# Patient Record
Sex: MALE | Race: WHITE | NOT HISPANIC OR LATINO | Employment: OTHER | URBAN - METROPOLITAN AREA
[De-identification: names, ages, dates, MRNs, and addresses within clinical notes are randomized per-mention and may not be internally consistent; named-entity substitution may affect disease eponyms.]

---

## 2022-01-03 ENCOUNTER — APPOINTMENT (OUTPATIENT)
Dept: RADIOLOGY | Facility: MEDICAL CENTER | Age: 67
DRG: 286 | End: 2022-01-03
Attending: EMERGENCY MEDICINE
Payer: MEDICARE

## 2022-01-03 ENCOUNTER — HOSPITAL ENCOUNTER (INPATIENT)
Facility: MEDICAL CENTER | Age: 67
LOS: 5 days | DRG: 286 | End: 2022-01-08
Attending: EMERGENCY MEDICINE | Admitting: STUDENT IN AN ORGANIZED HEALTH CARE EDUCATION/TRAINING PROGRAM
Payer: MEDICARE

## 2022-01-03 DIAGNOSIS — J96.01 ACUTE RESPIRATORY FAILURE WITH HYPOXIA (HCC): ICD-10-CM

## 2022-01-03 DIAGNOSIS — I50.21 ACUTE SYSTOLIC HEART FAILURE (HCC): ICD-10-CM

## 2022-01-03 DIAGNOSIS — R07.9 CHEST PAIN, UNSPECIFIED TYPE: ICD-10-CM

## 2022-01-03 DIAGNOSIS — I48.91 ATRIAL FIBRILLATION WITH RAPID VENTRICULAR RESPONSE (HCC): ICD-10-CM

## 2022-01-03 DIAGNOSIS — N17.9 AKI (ACUTE KIDNEY INJURY) (HCC): ICD-10-CM

## 2022-01-03 DIAGNOSIS — E87.70 HYPERVOLEMIA, UNSPECIFIED HYPERVOLEMIA TYPE: ICD-10-CM

## 2022-01-03 DIAGNOSIS — I48.91 ATRIAL FIBRILLATION, UNSPECIFIED TYPE (HCC): ICD-10-CM

## 2022-01-03 PROBLEM — E78.5 DYSLIPIDEMIA: Status: ACTIVE | Noted: 2022-01-03

## 2022-01-03 LAB
ALBUMIN SERPL BCP-MCNC: 3.7 G/DL (ref 3.2–4.9)
ALBUMIN/GLOB SERPL: 1.6 G/DL
ALP SERPL-CCNC: 117 U/L (ref 30–99)
ALT SERPL-CCNC: 49 U/L (ref 2–50)
ANION GAP SERPL CALC-SCNC: 13 MMOL/L (ref 7–16)
AST SERPL-CCNC: 41 U/L (ref 12–45)
BASOPHILS # BLD AUTO: 0.8 % (ref 0–1.8)
BASOPHILS # BLD: 0.08 K/UL (ref 0–0.12)
BILIRUB SERPL-MCNC: 1.2 MG/DL (ref 0.1–1.5)
BUN SERPL-MCNC: 35 MG/DL (ref 8–22)
CALCIUM SERPL-MCNC: 9.3 MG/DL (ref 8.5–10.5)
CHLORIDE SERPL-SCNC: 102 MMOL/L (ref 96–112)
CO2 SERPL-SCNC: 26 MMOL/L (ref 20–33)
CREAT SERPL-MCNC: 1.62 MG/DL (ref 0.5–1.4)
EKG IMPRESSION: NORMAL
EOSINOPHIL # BLD AUTO: 0.13 K/UL (ref 0–0.51)
EOSINOPHIL NFR BLD: 1.2 % (ref 0–6.9)
ERYTHROCYTE [DISTWIDTH] IN BLOOD BY AUTOMATED COUNT: 48.8 FL (ref 35.9–50)
GLOBULIN SER CALC-MCNC: 2.3 G/DL (ref 1.9–3.5)
GLUCOSE SERPL-MCNC: 85 MG/DL (ref 65–99)
HCT VFR BLD AUTO: 46.3 % (ref 42–52)
HGB BLD-MCNC: 15.2 G/DL (ref 14–18)
IMM GRANULOCYTES # BLD AUTO: 0.05 K/UL (ref 0–0.11)
IMM GRANULOCYTES NFR BLD AUTO: 0.5 % (ref 0–0.9)
LACTATE BLD-SCNC: 1.5 MMOL/L (ref 0.5–2)
LYMPHOCYTES # BLD AUTO: 1.72 K/UL (ref 1–4.8)
LYMPHOCYTES NFR BLD: 16.3 % (ref 22–41)
MCH RBC QN AUTO: 30.1 PG (ref 27–33)
MCHC RBC AUTO-ENTMCNC: 32.8 G/DL (ref 33.7–35.3)
MCV RBC AUTO: 91.7 FL (ref 81.4–97.8)
MONOCYTES # BLD AUTO: 1.09 K/UL (ref 0–0.85)
MONOCYTES NFR BLD AUTO: 10.4 % (ref 0–13.4)
NEUTROPHILS # BLD AUTO: 7.46 K/UL (ref 1.82–7.42)
NEUTROPHILS NFR BLD: 70.8 % (ref 44–72)
NRBC # BLD AUTO: 0 K/UL
NRBC BLD-RTO: 0 /100 WBC
NT-PROBNP SERPL IA-MCNC: 7400 PG/ML (ref 0–125)
PLATELET # BLD AUTO: 298 K/UL (ref 164–446)
PMV BLD AUTO: 11.3 FL (ref 9–12.9)
POTASSIUM SERPL-SCNC: 4.3 MMOL/L (ref 3.6–5.5)
PROT SERPL-MCNC: 6 G/DL (ref 6–8.2)
RBC # BLD AUTO: 5.05 M/UL (ref 4.7–6.1)
SODIUM SERPL-SCNC: 141 MMOL/L (ref 135–145)
TROPONIN T SERPL-MCNC: 51 NG/L (ref 6–19)
WBC # BLD AUTO: 10.5 K/UL (ref 4.8–10.8)

## 2022-01-03 PROCEDURE — 770020 HCHG ROOM/CARE - TELE (206)

## 2022-01-03 PROCEDURE — A9270 NON-COVERED ITEM OR SERVICE: HCPCS | Performed by: STUDENT IN AN ORGANIZED HEALTH CARE EDUCATION/TRAINING PROGRAM

## 2022-01-03 PROCEDURE — 96372 THER/PROPH/DIAG INJ SC/IM: CPT

## 2022-01-03 PROCEDURE — 84145 PROCALCITONIN (PCT): CPT

## 2022-01-03 PROCEDURE — 84484 ASSAY OF TROPONIN QUANT: CPT

## 2022-01-03 PROCEDURE — 700111 HCHG RX REV CODE 636 W/ 250 OVERRIDE (IP): Performed by: STUDENT IN AN ORGANIZED HEALTH CARE EDUCATION/TRAINING PROGRAM

## 2022-01-03 PROCEDURE — 700102 HCHG RX REV CODE 250 W/ 637 OVERRIDE(OP): Performed by: STUDENT IN AN ORGANIZED HEALTH CARE EDUCATION/TRAINING PROGRAM

## 2022-01-03 PROCEDURE — 93005 ELECTROCARDIOGRAM TRACING: CPT

## 2022-01-03 PROCEDURE — 99285 EMERGENCY DEPT VISIT HI MDM: CPT

## 2022-01-03 PROCEDURE — 36415 COLL VENOUS BLD VENIPUNCTURE: CPT

## 2022-01-03 PROCEDURE — 80053 COMPREHEN METABOLIC PANEL: CPT

## 2022-01-03 PROCEDURE — 84100 ASSAY OF PHOSPHORUS: CPT

## 2022-01-03 PROCEDURE — 700111 HCHG RX REV CODE 636 W/ 250 OVERRIDE (IP): Performed by: EMERGENCY MEDICINE

## 2022-01-03 PROCEDURE — 87040 BLOOD CULTURE FOR BACTERIA: CPT

## 2022-01-03 PROCEDURE — 84443 ASSAY THYROID STIM HORMONE: CPT

## 2022-01-03 PROCEDURE — 83605 ASSAY OF LACTIC ACID: CPT

## 2022-01-03 PROCEDURE — 93005 ELECTROCARDIOGRAM TRACING: CPT | Performed by: EMERGENCY MEDICINE

## 2022-01-03 PROCEDURE — 96375 TX/PRO/DX INJ NEW DRUG ADDON: CPT

## 2022-01-03 PROCEDURE — 85025 COMPLETE CBC W/AUTO DIFF WBC: CPT

## 2022-01-03 PROCEDURE — 71045 X-RAY EXAM CHEST 1 VIEW: CPT

## 2022-01-03 PROCEDURE — 96365 THER/PROPH/DIAG IV INF INIT: CPT

## 2022-01-03 PROCEDURE — 83735 ASSAY OF MAGNESIUM: CPT

## 2022-01-03 PROCEDURE — 700105 HCHG RX REV CODE 258: Performed by: EMERGENCY MEDICINE

## 2022-01-03 PROCEDURE — 96367 TX/PROPH/DG ADDL SEQ IV INF: CPT

## 2022-01-03 PROCEDURE — 83880 ASSAY OF NATRIURETIC PEPTIDE: CPT

## 2022-01-03 PROCEDURE — 99223 1ST HOSP IP/OBS HIGH 75: CPT | Mod: AI | Performed by: STUDENT IN AN ORGANIZED HEALTH CARE EDUCATION/TRAINING PROGRAM

## 2022-01-03 RX ORDER — ONDANSETRON 4 MG/1
4 TABLET, ORALLY DISINTEGRATING ORAL EVERY 4 HOURS PRN
Status: DISCONTINUED | OUTPATIENT
Start: 2022-01-03 | End: 2022-01-08 | Stop reason: HOSPADM

## 2022-01-03 RX ORDER — DILTIAZEM HYDROCHLORIDE 60 MG/1
60 TABLET, FILM COATED ORAL EVERY 6 HOURS
Status: DISCONTINUED | OUTPATIENT
Start: 2022-01-03 | End: 2022-01-04

## 2022-01-03 RX ORDER — ALBUTEROL SULFATE 90 UG/1
2 AEROSOL, METERED RESPIRATORY (INHALATION) EVERY 6 HOURS PRN
COMMUNITY

## 2022-01-03 RX ORDER — FUROSEMIDE 10 MG/ML
40 INJECTION INTRAMUSCULAR; INTRAVENOUS ONCE
Status: DISCONTINUED | OUTPATIENT
Start: 2022-01-03 | End: 2022-01-03

## 2022-01-03 RX ORDER — ROSUVASTATIN CALCIUM 10 MG/1
10 TABLET, COATED ORAL DAILY
Status: ON HOLD | COMMUNITY
End: 2022-01-08 | Stop reason: SDUPTHER

## 2022-01-03 RX ORDER — DILTIAZEM HYDROCHLORIDE 5 MG/ML
0.25 INJECTION INTRAVENOUS ONCE
Status: COMPLETED | OUTPATIENT
Start: 2022-01-03 | End: 2022-01-03

## 2022-01-03 RX ORDER — ONDANSETRON 2 MG/ML
4 INJECTION INTRAMUSCULAR; INTRAVENOUS EVERY 4 HOURS PRN
Status: DISCONTINUED | OUTPATIENT
Start: 2022-01-03 | End: 2022-01-08 | Stop reason: HOSPADM

## 2022-01-03 RX ORDER — POLYETHYLENE GLYCOL 3350 17 G/17G
1 POWDER, FOR SOLUTION ORAL
Status: DISCONTINUED | OUTPATIENT
Start: 2022-01-03 | End: 2022-01-08 | Stop reason: HOSPADM

## 2022-01-03 RX ORDER — LABETALOL HYDROCHLORIDE 5 MG/ML
10 INJECTION, SOLUTION INTRAVENOUS EVERY 4 HOURS PRN
Status: DISCONTINUED | OUTPATIENT
Start: 2022-01-03 | End: 2022-01-08 | Stop reason: HOSPADM

## 2022-01-03 RX ORDER — FUROSEMIDE 40 MG/1
60 TABLET ORAL
Status: DISCONTINUED | OUTPATIENT
Start: 2022-01-04 | End: 2022-01-04

## 2022-01-03 RX ORDER — IPRATROPIUM BROMIDE AND ALBUTEROL SULFATE 2.5; .5 MG/3ML; MG/3ML
3 SOLUTION RESPIRATORY (INHALATION)
Status: DISCONTINUED | OUTPATIENT
Start: 2022-01-03 | End: 2022-01-08 | Stop reason: HOSPADM

## 2022-01-03 RX ORDER — FUROSEMIDE 40 MG/1
40 TABLET ORAL DAILY
Status: ON HOLD | COMMUNITY
End: 2022-01-08

## 2022-01-03 RX ORDER — BISACODYL 10 MG
10 SUPPOSITORY, RECTAL RECTAL
Status: DISCONTINUED | OUTPATIENT
Start: 2022-01-03 | End: 2022-01-08 | Stop reason: HOSPADM

## 2022-01-03 RX ORDER — GUAIFENESIN 600 MG/1
600 TABLET, EXTENDED RELEASE ORAL EVERY 12 HOURS
Status: DISCONTINUED | OUTPATIENT
Start: 2022-01-03 | End: 2022-01-04

## 2022-01-03 RX ORDER — AZITHROMYCIN 500 MG/1
500 INJECTION, POWDER, LYOPHILIZED, FOR SOLUTION INTRAVENOUS ONCE
Status: COMPLETED | OUTPATIENT
Start: 2022-01-03 | End: 2022-01-03

## 2022-01-03 RX ORDER — ACETAMINOPHEN 325 MG/1
650 TABLET ORAL EVERY 6 HOURS PRN
Status: DISCONTINUED | OUTPATIENT
Start: 2022-01-03 | End: 2022-01-08 | Stop reason: HOSPADM

## 2022-01-03 RX ORDER — AMOXICILLIN 250 MG
2 CAPSULE ORAL 2 TIMES DAILY
Status: DISCONTINUED | OUTPATIENT
Start: 2022-01-03 | End: 2022-01-08 | Stop reason: HOSPADM

## 2022-01-03 RX ORDER — ROSUVASTATIN CALCIUM 10 MG/1
10 TABLET, COATED ORAL DAILY
Status: DISCONTINUED | OUTPATIENT
Start: 2022-01-04 | End: 2022-01-08 | Stop reason: HOSPADM

## 2022-01-03 RX ORDER — LOSARTAN POTASSIUM 50 MG/1
50 TABLET ORAL DAILY
Status: ON HOLD | COMMUNITY
End: 2022-01-08

## 2022-01-03 RX ORDER — FUROSEMIDE 10 MG/ML
80 INJECTION INTRAMUSCULAR; INTRAVENOUS ONCE
Status: COMPLETED | OUTPATIENT
Start: 2022-01-03 | End: 2022-01-03

## 2022-01-03 RX ADMIN — SODIUM CHLORIDE 3 G: 900 INJECTION INTRAVENOUS at 21:06

## 2022-01-03 RX ADMIN — FUROSEMIDE 80 MG: 10 INJECTION, SOLUTION INTRAMUSCULAR; INTRAVENOUS at 23:33

## 2022-01-03 RX ADMIN — AZITHROMYCIN MONOHYDRATE 500 MG: 500 INJECTION, POWDER, LYOPHILIZED, FOR SOLUTION INTRAVENOUS at 21:54

## 2022-01-03 RX ADMIN — GUAIFENESIN 600 MG: 600 TABLET, EXTENDED RELEASE ORAL at 23:34

## 2022-01-03 RX ADMIN — ENOXAPARIN SODIUM 80 MG: 80 INJECTION SUBCUTANEOUS at 23:32

## 2022-01-03 RX ADMIN — DILTIAZEM HYDROCHLORIDE 60 MG: 60 TABLET, FILM COATED ORAL at 23:32

## 2022-01-03 RX ADMIN — DILTIAZEM HYDROCHLORIDE 21.85 MG: 5 INJECTION INTRAVENOUS at 20:53

## 2022-01-03 ASSESSMENT — COPD QUESTIONNAIRES
COPD SCREENING SCORE: 2
HAVE YOU SMOKED AT LEAST 100 CIGARETTES IN YOUR ENTIRE LIFE: NO/DON'T KNOW
DO YOU EVER COUGH UP ANY MUCUS OR PHLEGM?: NO/ONLY WITH OCCASIONAL COLDS OR INFECTIONS
DURING THE PAST 4 WEEKS HOW MUCH DID YOU FEEL SHORT OF BREATH: NONE/LITTLE OF THE TIME

## 2022-01-03 ASSESSMENT — LIFESTYLE VARIABLES: EVER_SMOKED: YES

## 2022-01-03 NOTE — ED TRIAGE NOTES
"Chief Complaint   Patient presents with   • Shortness of Breath     progressively worse x3 months. Started on Prednisone by his NP and given an albuterol inhaler. Pt had home oxygen monitoring and states his oxygen saturations dip below 80% while sleeping. Pt states he has also been retaining fluid since starting Prednisone.     • Leg Swelling     /65   Pulse 61   Temp 36.5 °C (97.7 °F) (Temporal)   Resp 16   Ht 1.778 m (5' 10\")   Wt 87.3 kg (192 lb 7.4 oz)   SpO2 93%   BMI 27.62 kg/m²      Pt ambulatory to triage with a steady gait. No obvious distress at this time. Pt was recently started on Lasix for BLLE swelling.      "

## 2022-01-04 ENCOUNTER — APPOINTMENT (OUTPATIENT)
Dept: RADIOLOGY | Facility: MEDICAL CENTER | Age: 67
DRG: 286 | End: 2022-01-04
Attending: STUDENT IN AN ORGANIZED HEALTH CARE EDUCATION/TRAINING PROGRAM
Payer: MEDICARE

## 2022-01-04 ENCOUNTER — APPOINTMENT (OUTPATIENT)
Dept: CARDIOLOGY | Facility: MEDICAL CENTER | Age: 67
DRG: 286 | End: 2022-01-04
Attending: STUDENT IN AN ORGANIZED HEALTH CARE EDUCATION/TRAINING PROGRAM
Payer: MEDICARE

## 2022-01-04 PROBLEM — J96.01 ACUTE RESPIRATORY FAILURE WITH HYPOXIA (HCC): Status: ACTIVE | Noted: 2022-01-04

## 2022-01-04 PROBLEM — I10 PRIMARY HYPERTENSION: Status: ACTIVE | Noted: 2022-01-04

## 2022-01-04 PROBLEM — R79.89 ELEVATED TROPONIN: Status: ACTIVE | Noted: 2022-01-04

## 2022-01-04 PROBLEM — E87.70 VOLUME OVERLOAD: Status: ACTIVE | Noted: 2022-01-04

## 2022-01-04 PROBLEM — N17.9 AKI (ACUTE KIDNEY INJURY) (HCC): Status: ACTIVE | Noted: 2022-01-04

## 2022-01-04 LAB
ANION GAP SERPL CALC-SCNC: 15 MMOL/L (ref 7–16)
ANION GAP SERPL CALC-SCNC: 25 MMOL/L (ref 7–16)
APPEARANCE UR: CLEAR
BASOPHILS # BLD AUTO: 0.7 % (ref 0–1.8)
BASOPHILS # BLD: 0.09 K/UL (ref 0–0.12)
BILIRUB UR QL STRIP.AUTO: NEGATIVE
BUN SERPL-MCNC: 39 MG/DL (ref 8–22)
BUN SERPL-MCNC: 47 MG/DL (ref 8–22)
CALCIUM SERPL-MCNC: 8.4 MG/DL (ref 8.5–10.5)
CALCIUM SERPL-MCNC: 9.2 MG/DL (ref 8.5–10.5)
CHLORIDE SERPL-SCNC: 102 MMOL/L (ref 96–112)
CHLORIDE SERPL-SCNC: 95 MMOL/L (ref 96–112)
CK SERPL-CCNC: 118 U/L (ref 0–154)
CO2 SERPL-SCNC: 18 MMOL/L (ref 20–33)
CO2 SERPL-SCNC: 23 MMOL/L (ref 20–33)
COLOR UR: YELLOW
CREAT SERPL-MCNC: 1.55 MG/DL (ref 0.5–1.4)
CREAT SERPL-MCNC: 2.26 MG/DL (ref 0.5–1.4)
CREAT UR-MCNC: 25.85 MG/DL
EKG IMPRESSION: NORMAL
EOSINOPHIL # BLD AUTO: 0.07 K/UL (ref 0–0.51)
EOSINOPHIL NFR BLD: 0.6 % (ref 0–6.9)
ERYTHROCYTE [DISTWIDTH] IN BLOOD BY AUTOMATED COUNT: 49.3 FL (ref 35.9–50)
FLUAV RNA SPEC QL NAA+PROBE: NEGATIVE
FLUBV RNA SPEC QL NAA+PROBE: NEGATIVE
GLUCOSE SERPL-MCNC: 100 MG/DL (ref 65–99)
GLUCOSE SERPL-MCNC: 171 MG/DL (ref 65–99)
GLUCOSE UR STRIP.AUTO-MCNC: NEGATIVE MG/DL
HCT VFR BLD AUTO: 44 % (ref 42–52)
HGB BLD-MCNC: 14.2 G/DL (ref 14–18)
IMM GRANULOCYTES # BLD AUTO: 0.06 K/UL (ref 0–0.11)
IMM GRANULOCYTES NFR BLD AUTO: 0.5 % (ref 0–0.9)
KETONES UR STRIP.AUTO-MCNC: NEGATIVE MG/DL
LEUKOCYTE ESTERASE UR QL STRIP.AUTO: NEGATIVE
LV EJECT FRACT  99904: 20
LV EJECT FRACT MOD 2C 99903: 20.49
LV EJECT FRACT MOD 4C 99902: 15.92
LV EJECT FRACT MOD BP 99901: 22.48
LYMPHOCYTES # BLD AUTO: 1.8 K/UL (ref 1–4.8)
LYMPHOCYTES NFR BLD: 14.9 % (ref 22–41)
MAGNESIUM SERPL-MCNC: 2.4 MG/DL (ref 1.5–2.5)
MAGNESIUM SERPL-MCNC: 2.8 MG/DL (ref 1.5–2.5)
MCH RBC QN AUTO: 29.8 PG (ref 27–33)
MCHC RBC AUTO-ENTMCNC: 32.3 G/DL (ref 33.7–35.3)
MCV RBC AUTO: 92.2 FL (ref 81.4–97.8)
MICRO URNS: NORMAL
MONOCYTES # BLD AUTO: 0.96 K/UL (ref 0–0.85)
MONOCYTES NFR BLD AUTO: 7.9 % (ref 0–13.4)
NEUTROPHILS # BLD AUTO: 9.1 K/UL (ref 1.82–7.42)
NEUTROPHILS NFR BLD: 75.4 % (ref 44–72)
NITRITE UR QL STRIP.AUTO: NEGATIVE
NRBC # BLD AUTO: 0 K/UL
NRBC BLD-RTO: 0 /100 WBC
PH UR STRIP.AUTO: 6.5 [PH] (ref 5–8)
PHOSPHATE SERPL-MCNC: 4.7 MG/DL (ref 2.5–4.5)
PLATELET # BLD AUTO: 277 K/UL (ref 164–446)
PMV BLD AUTO: 11.1 FL (ref 9–12.9)
POTASSIUM SERPL-SCNC: 3.7 MMOL/L (ref 3.6–5.5)
POTASSIUM SERPL-SCNC: 4.4 MMOL/L (ref 3.6–5.5)
PROCALCITONIN SERPL-MCNC: 0.15 NG/ML
PROT UR QL STRIP: NEGATIVE MG/DL
RBC # BLD AUTO: 4.77 M/UL (ref 4.7–6.1)
RBC UR QL AUTO: NEGATIVE
RSV RNA SPEC QL NAA+PROBE: NEGATIVE
SARS-COV-2 RNA RESP QL NAA+PROBE: NOTDETECTED
SODIUM SERPL-SCNC: 138 MMOL/L (ref 135–145)
SODIUM SERPL-SCNC: 140 MMOL/L (ref 135–145)
SODIUM UR-SCNC: 57 MMOL/L
SP GR UR STRIP.AUTO: 1.01
SPECIMEN SOURCE: NORMAL
TSH SERPL DL<=0.005 MIU/L-ACNC: 2.34 UIU/ML (ref 0.38–5.33)
UROBILINOGEN UR STRIP.AUTO-MCNC: 0.2 MG/DL
WBC # BLD AUTO: 12.1 K/UL (ref 4.8–10.8)

## 2022-01-04 PROCEDURE — 96376 TX/PRO/DX INJ SAME DRUG ADON: CPT

## 2022-01-04 PROCEDURE — 99233 SBSQ HOSP IP/OBS HIGH 50: CPT | Mod: 25 | Performed by: STUDENT IN AN ORGANIZED HEALTH CARE EDUCATION/TRAINING PROGRAM

## 2022-01-04 PROCEDURE — 82570 ASSAY OF URINE CREATININE: CPT

## 2022-01-04 PROCEDURE — 94640 AIRWAY INHALATION TREATMENT: CPT

## 2022-01-04 PROCEDURE — 83735 ASSAY OF MAGNESIUM: CPT

## 2022-01-04 PROCEDURE — 3E02340 INTRODUCTION OF INFLUENZA VACCINE INTO MUSCLE, PERCUTANEOUS APPROACH: ICD-10-PCS | Performed by: STUDENT IN AN ORGANIZED HEALTH CARE EDUCATION/TRAINING PROGRAM

## 2022-01-04 PROCEDURE — 93306 TTE W/DOPPLER COMPLETE: CPT | Mod: 26 | Performed by: INTERNAL MEDICINE

## 2022-01-04 PROCEDURE — 700102 HCHG RX REV CODE 250 W/ 637 OVERRIDE(OP): Performed by: STUDENT IN AN ORGANIZED HEALTH CARE EDUCATION/TRAINING PROGRAM

## 2022-01-04 PROCEDURE — 84300 ASSAY OF URINE SODIUM: CPT

## 2022-01-04 PROCEDURE — 93005 ELECTROCARDIOGRAM TRACING: CPT | Performed by: STUDENT IN AN ORGANIZED HEALTH CARE EDUCATION/TRAINING PROGRAM

## 2022-01-04 PROCEDURE — 700111 HCHG RX REV CODE 636 W/ 250 OVERRIDE (IP): Performed by: STUDENT IN AN ORGANIZED HEALTH CARE EDUCATION/TRAINING PROGRAM

## 2022-01-04 PROCEDURE — 71045 X-RAY EXAM CHEST 1 VIEW: CPT

## 2022-01-04 PROCEDURE — 81003 URINALYSIS AUTO W/O SCOPE: CPT

## 2022-01-04 PROCEDURE — A9270 NON-COVERED ITEM OR SERVICE: HCPCS | Performed by: INTERNAL MEDICINE

## 2022-01-04 PROCEDURE — 82550 ASSAY OF CK (CPK): CPT

## 2022-01-04 PROCEDURE — 3E0234Z INTRODUCTION OF SERUM, TOXOID AND VACCINE INTO MUSCLE, PERCUTANEOUS APPROACH: ICD-10-PCS | Performed by: STUDENT IN AN ORGANIZED HEALTH CARE EDUCATION/TRAINING PROGRAM

## 2022-01-04 PROCEDURE — 700111 HCHG RX REV CODE 636 W/ 250 OVERRIDE (IP): Performed by: INTERNAL MEDICINE

## 2022-01-04 PROCEDURE — 99291 CRITICAL CARE FIRST HOUR: CPT | Performed by: STUDENT IN AN ORGANIZED HEALTH CARE EDUCATION/TRAINING PROGRAM

## 2022-01-04 PROCEDURE — 700101 HCHG RX REV CODE 250: Performed by: STUDENT IN AN ORGANIZED HEALTH CARE EDUCATION/TRAINING PROGRAM

## 2022-01-04 PROCEDURE — 80048 BASIC METABOLIC PNL TOTAL CA: CPT

## 2022-01-04 PROCEDURE — A9270 NON-COVERED ITEM OR SERVICE: HCPCS | Performed by: STUDENT IN AN ORGANIZED HEALTH CARE EDUCATION/TRAINING PROGRAM

## 2022-01-04 PROCEDURE — 36415 COLL VENOUS BLD VENIPUNCTURE: CPT

## 2022-01-04 PROCEDURE — 700102 HCHG RX REV CODE 250 W/ 637 OVERRIDE(OP): Performed by: INTERNAL MEDICINE

## 2022-01-04 PROCEDURE — 85025 COMPLETE CBC W/AUTO DIFF WBC: CPT

## 2022-01-04 PROCEDURE — 51798 US URINE CAPACITY MEASURE: CPT

## 2022-01-04 PROCEDURE — 700105 HCHG RX REV CODE 258: Performed by: STUDENT IN AN ORGANIZED HEALTH CARE EDUCATION/TRAINING PROGRAM

## 2022-01-04 PROCEDURE — 0241U HCHG SARS-COV-2 COVID-19 NFCT DS RESP RNA 4 TRGT MIC: CPT

## 2022-01-04 PROCEDURE — 93010 ELECTROCARDIOGRAM REPORT: CPT | Performed by: INTERNAL MEDICINE

## 2022-01-04 PROCEDURE — C9803 HOPD COVID-19 SPEC COLLECT: HCPCS | Performed by: STUDENT IN AN ORGANIZED HEALTH CARE EDUCATION/TRAINING PROGRAM

## 2022-01-04 PROCEDURE — 96372 THER/PROPH/DIAG INJ SC/IM: CPT

## 2022-01-04 PROCEDURE — 93306 TTE W/DOPPLER COMPLETE: CPT

## 2022-01-04 PROCEDURE — 770020 HCHG ROOM/CARE - TELE (206)

## 2022-01-04 RX ORDER — BENZONATATE 100 MG/1
100 CAPSULE ORAL 3 TIMES DAILY PRN
Status: DISCONTINUED | OUTPATIENT
Start: 2022-01-04 | End: 2022-01-08 | Stop reason: HOSPADM

## 2022-01-04 RX ORDER — FAMOTIDINE 20 MG/1
20 TABLET, FILM COATED ORAL DAILY
Status: DISCONTINUED | OUTPATIENT
Start: 2022-01-05 | End: 2022-01-08 | Stop reason: HOSPADM

## 2022-01-04 RX ORDER — POTASSIUM CHLORIDE 20 MEQ/1
40 TABLET, EXTENDED RELEASE ORAL DAILY
Status: DISCONTINUED | OUTPATIENT
Start: 2022-01-04 | End: 2022-01-07

## 2022-01-04 RX ORDER — FUROSEMIDE 10 MG/ML
80 INJECTION INTRAMUSCULAR; INTRAVENOUS
Status: DISCONTINUED | OUTPATIENT
Start: 2022-01-04 | End: 2022-01-05

## 2022-01-04 RX ORDER — METOPROLOL TARTRATE 50 MG/1
50 TABLET, FILM COATED ORAL TWICE DAILY
Status: DISCONTINUED | OUTPATIENT
Start: 2022-01-04 | End: 2022-01-06

## 2022-01-04 RX ORDER — ZOLPIDEM TARTRATE 5 MG/1
5 TABLET ORAL NIGHTLY PRN
Status: DISCONTINUED | OUTPATIENT
Start: 2022-01-04 | End: 2022-01-08 | Stop reason: HOSPADM

## 2022-01-04 RX ORDER — GUAIFENESIN 600 MG/1
600 TABLET, EXTENDED RELEASE ORAL EVERY 12 HOURS
Status: DISCONTINUED | OUTPATIENT
Start: 2022-01-05 | End: 2022-01-08 | Stop reason: HOSPADM

## 2022-01-04 RX ORDER — FUROSEMIDE 10 MG/ML
80 INJECTION INTRAMUSCULAR; INTRAVENOUS ONCE
Status: DISCONTINUED | OUTPATIENT
Start: 2022-01-04 | End: 2022-01-05

## 2022-01-04 RX ORDER — FUROSEMIDE 10 MG/ML
40 INJECTION INTRAMUSCULAR; INTRAVENOUS
Status: DISCONTINUED | OUTPATIENT
Start: 2022-01-04 | End: 2022-01-04

## 2022-01-04 RX ADMIN — GUAIFENESIN 600 MG: 600 TABLET, EXTENDED RELEASE ORAL at 05:09

## 2022-01-04 RX ADMIN — METOPROLOL TARTRATE 50 MG: 50 TABLET, FILM COATED ORAL at 16:45

## 2022-01-04 RX ADMIN — ENOXAPARIN SODIUM 80 MG: 80 INJECTION SUBCUTANEOUS at 11:47

## 2022-01-04 RX ADMIN — POTASSIUM CHLORIDE 40 MEQ: 1500 TABLET, EXTENDED RELEASE ORAL at 16:45

## 2022-01-04 RX ADMIN — FUROSEMIDE 80 MG: 10 INJECTION, SOLUTION INTRAMUSCULAR; INTRAVENOUS at 16:44

## 2022-01-04 RX ADMIN — ASPIRIN 81 MG: 81 TABLET, COATED ORAL at 05:10

## 2022-01-04 RX ADMIN — IPRATROPIUM BROMIDE AND ALBUTEROL SULFATE 3 ML: .5; 2.5 SOLUTION RESPIRATORY (INHALATION) at 23:05

## 2022-01-04 RX ADMIN — LIDOCAINE HYDROCHLORIDE 30 ML: 20 SOLUTION OROPHARYNGEAL at 19:16

## 2022-01-04 RX ADMIN — FAMOTIDINE 20 MG: 10 INJECTION INTRAVENOUS at 19:19

## 2022-01-04 RX ADMIN — DILTIAZEM HYDROCHLORIDE 60 MG: 60 TABLET, FILM COATED ORAL at 05:09

## 2022-01-04 RX ADMIN — ROSUVASTATIN CALCIUM 10 MG: 20 TABLET, FILM COATED ORAL at 05:10

## 2022-01-04 RX ADMIN — CHLOROTHIAZIDE SODIUM 500 MG: 500 INJECTION, POWDER, LYOPHILIZED, FOR SOLUTION INTRAVENOUS at 21:42

## 2022-01-04 RX ADMIN — APIXABAN 5 MG: 5 TABLET, FILM COATED ORAL at 21:42

## 2022-01-04 RX ADMIN — FUROSEMIDE 40 MG: 10 INJECTION, SOLUTION INTRAMUSCULAR; INTRAVENOUS at 05:11

## 2022-01-04 RX ADMIN — DILTIAZEM HYDROCHLORIDE 60 MG: 60 TABLET, FILM COATED ORAL at 11:47

## 2022-01-04 ASSESSMENT — CHA2DS2 SCORE
AGE 65 TO 74: YES
CHA2DS2 VASC SCORE: 4
HYPERTENSION: YES
PRIOR STROKE OR TIA OR THROMBOEMBOLISM: NO
CHF OR LEFT VENTRICULAR DYSFUNCTION: YES
DIABETES: NO
SEX: MALE
VASCULAR DISEASE: YES
AGE 75 OR GREATER: NO

## 2022-01-04 ASSESSMENT — ENCOUNTER SYMPTOMS
DIARRHEA: 0
VOMITING: 0
FALLS: 0
SENSORY CHANGE: 0
NERVOUS/ANXIOUS: 0
EYE PAIN: 0
ORTHOPNEA: 1
PHOTOPHOBIA: 0
FEVER: 0
COUGH: 1
SEIZURES: 0
FOCAL WEAKNESS: 0
SPEECH CHANGE: 0
SPUTUM PRODUCTION: 1
NAUSEA: 0
PND: 1
ABDOMINAL PAIN: 0
PALPITATIONS: 0
CHILLS: 0
SHORTNESS OF BREATH: 1
LOSS OF CONSCIOUSNESS: 0

## 2022-01-04 ASSESSMENT — LIFESTYLE VARIABLES: SUBSTANCE_ABUSE: 0

## 2022-01-04 ASSESSMENT — FIBROSIS 4 INDEX: FIB4 SCORE: 1.37

## 2022-01-04 NOTE — ED NOTES
PT up to bathroom. PT up standing at sink doing self care. Patient understands plan of care and wait for admitting bed.

## 2022-01-04 NOTE — H&P
Hospital Medicine History & Physical Note    Date of Service  1/3/2022    Primary Care Physician  No primary care provider on file.    Consultants  None    Code Status  Full Code    Chief Complaint  Chief Complaint   Patient presents with   • Shortness of Breath     progressively worse x3 months. Started on Prednisone by his NP and given an albuterol inhaler. Pt had home oxygen monitoring and states his oxygen saturations dip below 80% while sleeping. Pt states he has also been retaining fluid since starting Prednisone.     • Leg Swelling       History of Presenting Illness  Miah Oneill is a 66 y.o. male history of hypertension hyperlipidemia who presented 1/3/2022 with dyspnea, cough, was in volume overloaded state as well as A. fib with RVR, CHAYO.  Mr. Oneill states that he has been feeling chest congestion since August.  Patient is pretty adamant that he has a bronchitis/pneumonia since August.  He has been feeling symptoms of occasional cough that has been progressive and productive of frothy clear sputum, dyspnea on exertion now at rest also progressive now severe, peripheral edema for about 3 weeks progressive now severe.  He states he has been working with his NP regarding his chest congestion and has been given albuterol inhaler without relief, started on prednisone without relief.  Over the past 2 days his leg swelling is now severe and painful. He has associated orthopnea, paroxysmal nocturnal dyspnea, nocturnal hypoxia noted on home overnight pulse ox evaluation.  He is very uncomfortable when he is sitting down due to the swelling and is unable to lie flat.  He denies ever having any chest pain    I discussed the plan of care with patient, bedside RN and pharmacy.    Review of Systems  Review of Systems   Constitutional: Negative for chills and fever.   HENT: Negative for congestion and nosebleeds.    Eyes: Negative for photophobia and pain.   Respiratory: Positive for cough, sputum  production and shortness of breath.    Cardiovascular: Positive for orthopnea, leg swelling and PND. Negative for chest pain and palpitations.   Gastrointestinal: Negative for abdominal pain, diarrhea, nausea and vomiting.   Genitourinary: Negative for dysuria and urgency.   Musculoskeletal: Negative for falls and joint pain.   Neurological: Negative for sensory change, speech change, focal weakness, seizures and loss of consciousness.   Psychiatric/Behavioral: Negative for substance abuse. The patient is not nervous/anxious.        Past Medical History   has no past medical history on file.    Surgical History   has no past surgical history on file.     Family History  family history is not on file.   Family history reviewed with patient. There is no family history that is pertinent to the chief complaint.     Social History   reports that he quit smoking about 22 years ago. He has never used smokeless tobacco. He reports current alcohol use. He reports previous drug use.    Allergies  No Known Allergies    Medications  Prior to Admission Medications   Prescriptions Last Dose Informant Patient Reported? Taking?   albuterol 108 (90 Base) MCG/ACT Aero Soln inhalation aerosol 1/3/2022 at 1200  Yes Yes   Sig: Inhale 2 Puffs every 6 hours as needed for Shortness of Breath.   furosemide (LASIX) 40 MG Tab 1/3/2022 at AM  Yes Yes   Sig: Take 40 mg by mouth every day.   losartan (COZAAR) 50 MG Tab 1/3/2022 at AM  Yes Yes   Sig: Take 50 mg by mouth every day.   rosuvastatin (CRESTOR) 10 MG Tab 1/3/2022 at AM  Yes Yes   Sig: Take 10 mg by mouth every day.      Facility-Administered Medications: None       Physical Exam  Temp:  [36.5 °C (97.7 °F)-36.7 °C (98.1 °F)] 36.7 °C (98.1 °F)  Pulse:  [] 115  Resp:  [15-30] 20  BP: ()/(52-99) 103/63  SpO2:  [87 %-99 %] 95 %  Blood Pressure : 103/63   Temperature: 36.7 °C (98.1 °F)   Pulse: (!) 115   Respiration: 20   Pulse Oximetry: 95 %       Physical Exam  Vitals and  nursing note reviewed. Exam conducted with a chaperone present.   Constitutional:       General: He is not in acute distress.     Appearance: He is not toxic-appearing.      Comments: 66-year-old male appears stated age, alert and conversant, he is uncomfortable secondary to edema, he is standing stating it is uncomfortable for him to lie down   HENT:      Head: Normocephalic and atraumatic.      Nose: Nose normal. No rhinorrhea.      Mouth/Throat:      Mouth: Mucous membranes are moist.      Pharynx: Oropharynx is clear.   Eyes:      Extraocular Movements: Extraocular movements intact.      Conjunctiva/sclera: Conjunctivae normal.      Pupils: Pupils are equal, round, and reactive to light.   Cardiovascular:      Rate and Rhythm: Tachycardia present. Rhythm irregular.      Heart sounds: No murmur heard.      Pulmonary:      Effort: Pulmonary effort is normal. No respiratory distress.      Breath sounds: Rales present. No wheezing or rhonchi.   Abdominal:      General: Bowel sounds are normal.      Palpations: Abdomen is soft.      Tenderness: There is no abdominal tenderness. There is no guarding or rebound.   Musculoskeletal:         General: Swelling present. Normal range of motion.      Cervical back: Normal range of motion and neck supple.      Right lower leg: Edema present.      Left lower leg: Edema present.   Skin:     General: Skin is warm and dry.      Capillary Refill: Capillary refill takes less than 2 seconds.      Findings: No lesion.   Neurological:      General: No focal deficit present.      Mental Status: He is oriented to person, place, and time. Mental status is at baseline.      Cranial Nerves: No cranial nerve deficit.      Sensory: No sensory deficit.      Motor: No weakness.      Coordination: Coordination normal.   Psychiatric:         Mood and Affect: Mood normal.         Behavior: Behavior normal.         Thought Content: Thought content normal.         Judgment: Judgment normal.          Laboratory:  Recent Labs     01/03/22  1600 01/04/22  0320   WBC 10.5 12.1*   RBC 5.05 4.77   HEMOGLOBIN 15.2 14.2   HEMATOCRIT 46.3 44.0   MCV 91.7 92.2   MCH 30.1 29.8   MCHC 32.8* 32.3*   RDW 48.8 49.3   PLATELETCT 298 277   MPV 11.3 11.1     Recent Labs     01/03/22  1600 01/04/22  0320   SODIUM 141 140   POTASSIUM 4.3 3.7   CHLORIDE 102 102   CO2 26 23   GLUCOSE 85 100*   BUN 35* 39*   CREATININE 1.62* 1.55*   CALCIUM 9.3 8.4*     Recent Labs     01/03/22  1600 01/04/22  0320   ALTSGPT 49  --    ASTSGOT 41  --    ALKPHOSPHAT 117*  --    TBILIRUBIN 1.2  --    GLUCOSE 85 100*         Recent Labs     01/03/22  2050   NTPROBNP 7400*         Recent Labs     01/03/22  1600   TROPONINT 51*       Imaging:  DX-CHEST-PORTABLE (1 VIEW)   Final Result      1.  Pulmonary vascular congestion and minimal bilateral pleural effusions.  No overt pulmonary edema.   2.  Hazy RIGHT lung base opacity, atelectasis versus pneumonia.   3.  Mild cardiomegaly.      EC-ECHOCARDIOGRAM COMPLETE W/O CONT    (Results Pending)       X-Ray:  I have personally reviewed the images and compared with prior images. and My impression is: Pulmonary vascular congestion, minimal bilateral pleural effusions, hazy right lung base opacity  EKG:  I have personally reviewed the images and compared with prior images. and My impression is: Atrial fibrillation, PVCs, no acute ST segment elevation or depression    Assessment/Plan:  I anticipate this patient will require at least two midnights for appropriate medical management, necessitating inpatient admission.    * Atrial fibrillation with rapid ventricular response (HCC)- (present on admission)  Assessment & Plan  In setting of volume overload, unclear if undiagnosed CHF led to volume overload and RVR or if uncontrolled tachyarrhythmia causing congestive heart failure.  Diltiazem 60 mg p.o. every 6 hours  Monitor on telemetry      Elevated troponin- (present on admission)  Assessment & Plan  Initial  troponin 51, proBNP 7400.  No chest pain  EKG shows A. fib with RVR, T wave abnormality leads V4 through 6, no ST segment elevation or depressions.  Continue aspirin, trend troponin.  TTE.  Repeat EKG with any chest pain    Volume overload- (present on admission)  Assessment & Plan  In setting of A. fib with RVR.  Diuresis with INR monitoring  Monitor electrolytes  TTE, I suspect formal cardiology consultation will be required after TTE, suspicion for heart failure, on aspirin and statin already    Acute respiratory failure with hypoxia (HCC)- (present on admission)  Assessment & Plan  Oxygen per guidelines wean as able  Continuous pulse ox  Incentive spirometer  DuoNebs as needed  Secondary to volume overload state as well as A. fib with RVR.  Diuresis  Procalcitonin within normal limits    CHAYO (acute kidney injury) ([facility])- (present on admission)  Assessment & Plan  Suspecting Prerenal from vascular congestion from volume overload  FeNa studies ordered  U/a & CPK  Rule out post obstruction  Patient receiving diuresis for volume overload state, monitor renal function closely appears to have improved with diuresis.  Renal dose meds and avoid nephrotoxins  Monitor I&O's  Follow renal function    Primary hypertension- (present on admission)  Assessment & Plan  Hold home Cozaar, starting diltiazem for A. fib with RVR.  Diuresis Lasix 40 mg twice daily  IVF antihypertensives with parameters    Dyslipidemia- (present on admission)  Assessment & Plan  Continue Crestor        VTE prophylaxis: therapeutic anticoagulation with Lovenox

## 2022-01-04 NOTE — CONSULTS
Cardiology Initial Consult Note    Date of note:    1/4/2022      Consulting Physician: Rhonda Medina M.D.    Name:   Miah Oneill   YOB: 1955  Age:   66 y.o.  male   MRN:   [id number]      Reason for Consultation: New onset A. fib with RVR    HPI:  Miah Oneill is a 66 y.o. male with a history of CVA, hypertension and dyslipidemia who presented to the hospital on 1/3/2022 with ongoing dyspnea, lower extremity edema, cough and weight gain for the past couple of months.    Patient states that in November he started with a cough along with mild dyspnea on exertion.  Went to his primary care physician who diagnosed him with long-term Covid infection based on high antibody titers but tested negative for Covid infection.  He was treated with prednisone after which she started to notice bilateral lower extremity edema, abdominal bloating and worsening dyspnea on exertion.  Was started on albuterol inhaler and furosemide 40 mg daily but noticed minimal improvement in his symptoms.  For the past several weeks he has noted progressive lower extremity edema, orthopnea, PND, abdominal bloating, decreased appetite with nausea.  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    Upon presentation to the ED, was noted to be in A. fib with RVR and fluid overload.  Received 1 dose of IV Lasix 80 mg.    ECG: ECG performed 1/3/2022 was interpreted by me which shows A. fib with RVR    Labs are significant for CHAYO with creatinine 1.6 -> 1.55  NT proBNP 7400  Troponin T 51  Potassium 3.7    Family history: No history of arrhythmia    Social history: Former smoker    ROS  All others reviewed and negative except for pertinent positives mentioned in HPI.      History reviewed. No pertinent past medical history.    History reviewed. No pertinent surgical history.      (Not in a hospital admission)    Current Facility-Administered Medications   Medication  Dose Route Frequency Provider Last Rate Last Admin   • furosemide (LASIX) injection 40 mg  40 mg Intravenous BID DIURETIC Madhu Shields M.D.   40 mg at 01/04/22 0511   • rosuvastatin (CRESTOR) tablet 10 mg  10 mg Oral DAILY Madhu Shields M.D.   10 mg at 01/04/22 0510   • ipratropium-albuterol (DUONEB) nebulizer solution  3 mL Nebulization Q4H PRN (RT) Madhu Shields M.D.       • senna-docusate (PERICOLACE or SENOKOT S) 8.6-50 MG per tablet 2 Tablet  2 Tablet Oral BID Madhu Shields M.D.        And   • polyethylene glycol/lytes (MIRALAX) PACKET 1 Packet  1 Packet Oral QDAY PRN Madhu Shields M.D.        And   • magnesium hydroxide (MILK OF MAGNESIA) suspension 30 mL  30 mL Oral QDAY PRN Madhu Shields M.D.        And   • bisacodyl (DULCOLAX) suppository 10 mg  10 mg Rectal QDAY PRN Madhu Shields M.D.       • Respiratory Therapy Consult   Nebulization Continuous RT Madhu Shields M.D.       • acetaminophen (Tylenol) tablet 650 mg  650 mg Oral Q6HRS PRN Madhu Shields M.D.       • labetalol (NORMODYNE/TRANDATE) injection 10 mg  10 mg Intravenous Q4HRS PRN Madhu Shields M.D.       • aspirin EC (ECOTRIN) tablet 81 mg  81 mg Oral DAILY Madhu Shields M.D.   81 mg at 01/04/22 0510   • ondansetron (ZOFRAN) syringe/vial injection 4 mg  4 mg Intravenous Q4HRS PRN Madhu Shields M.D.       • ondansetron (ZOFRAN ODT) dispertab 4 mg  4 mg Oral Q4HRS PRN Madhu Shields M.D.       • DILTIAZem (CARDIZEM) tablet 60 mg  60 mg Oral Q6HRS Madhu Shields M.D.   60 mg at 01/04/22 1147   • benzocaine-menthol (CEPACOL) lozenge 1 Lozenge  1 Lozenge Mouth/Throat Q2HRS PRN Madhu M Cannone, M.D.       • enoxaparin (LOVENOX) inj 80 mg  1 mg/kg Subcutaneous Q12HRS Madhu Shields M.D.   80 mg at 01/04/22 1147     Current Outpatient Medications   Medication Sig Dispense Refill   • rosuvastatin (CRESTOR) 10 MG Tab Take 10 mg by mouth every day.     • losartan (COZAAR) 50 MG Tab Take 50 mg by mouth  every day.     • furosemide (LASIX) 40 MG Tab Take 40 mg by mouth every day.     • albuterol 108 (90 Base) MCG/ACT Aero Soln inhalation aerosol Inhale 2 Puffs every 6 hours as needed for Shortness of Breath.           No Known Allergies      History reviewed. No pertinent family history.      Social History     Socioeconomic History   • Marital status:      Spouse name: Not on file   • Number of children: Not on file   • Years of education: Not on file   • Highest education level: Not on file   Occupational History   • Not on file   Tobacco Use   • Smoking status: Former Smoker     Quit date:      Years since quittin.0   • Smokeless tobacco: Never Used   Substance and Sexual Activity   • Alcohol use: Yes     Comment: occasionally   • Drug use: Not Currently   • Sexual activity: Not on file   Other Topics Concern   • Not on file   Social History Narrative   • Not on file     Social Determinants of Health     Financial Resource Strain:    • Difficulty of Paying Living Expenses: Not on file   Food Insecurity:    • Worried About Running Out of Food in the Last Year: Not on file   • Ran Out of Food in the Last Year: Not on file   Transportation Needs:    • Lack of Transportation (Medical): Not on file   • Lack of Transportation (Non-Medical): Not on file   Physical Activity:    • Days of Exercise per Week: Not on file   • Minutes of Exercise per Session: Not on file   Stress:    • Feeling of Stress : Not on file   Social Connections:    • Frequency of Communication with Friends and Family: Not on file   • Frequency of Social Gatherings with Friends and Family: Not on file   • Attends Zoroastrianism Services: Not on file   • Active Member of Clubs or Organizations: Not on file   • Attends Club or Organization Meetings: Not on file   • Marital Status: Not on file   Intimate Partner Violence:    • Fear of Current or Ex-Partner: Not on file   • Emotionally Abused: Not on file   • Physically Abused: Not on file   •  "Sexually Abused: Not on file   Housing Stability:    • Unable to Pay for Housing in the Last Year: Not on file   • Number of Places Lived in the Last Year: Not on file   • Unstable Housing in the Last Year: Not on file           Intake/Output Summary (Last 24 hours) at 1/4/2022 1400  Last data filed at 1/3/2022 2149  Gross per 24 hour   Intake 100 ml   Output --   Net 100 ml        Physical Exam  Body mass index is 27.62 kg/m².  /56   Pulse (!) 137   Temp 36.7 °C (98.1 °F) (Temporal)   Resp 18   Ht 1.778 m (5' 10\")   Wt 87.3 kg (192 lb 7.4 oz)   SpO2 91%   Vitals:    01/04/22 1101 01/04/22 1147 01/04/22 1246 01/04/22 1301   BP: 126/63 160/120 102/63 114/56   Pulse: (!) 114  (!) 123 (!) 137   Resp: 18  18 18   Temp:       TempSrc:       SpO2: 94%  94% 91%   Weight:       Height:         Oxygen Therapy:  Pulse Oximetry: 91 %, O2 (LPM): 4, O2 Delivery Device: Nasal Cannula    General: Well appearing and in no apparent distress, supplemental oxygen  Eyes: nl conjunctiva  ENT: OP clear  Neck: JVP not elevated,  no carotid bruits  Lungs: normal respiratory effort, bilateral crackles heard in lower lung fields  Heart: Irregular rhythm, tachycardic, no murmurs, no rubs or gallops, +3 pitting edema bilateral lower extremities. No LV/RV heave on cardiac palpatation. 2+ bilateral radial pulses.  2+ bilateral dp pulses.   Abdomen: soft, non tender, non distended, no masses, normal bowel sounds.  No HSM.  Extremities/MSK: no clubbing, no cyanosis  Neurological: No focal sensory deficits  Psychiatric: Appropriate affect, A/O x 3  Skin: Warm extremities      Labs (personally reviewed and notable for):   Lab Results   Component Value Date/Time    SODIUM 140 01/04/2022 03:20 AM    POTASSIUM 3.7 01/04/2022 03:20 AM    CHLORIDE 102 01/04/2022 03:20 AM    CO2 23 01/04/2022 03:20 AM    GLUCOSE 100 (H) 01/04/2022 03:20 AM    BUN 39 (H) 01/04/2022 03:20 AM    CREATININE 1.55 (H) 01/04/2022 03:20 AM      Lab Results   Component " Value Date/Time    WBC 12.1 (H) 01/04/2022 03:20 AM    RBC 4.77 01/04/2022 03:20 AM    HEMOGLOBIN 14.2 01/04/2022 03:20 AM    HEMATOCRIT 44.0 01/04/2022 03:20 AM    MCV 92.2 01/04/2022 03:20 AM    MCH 29.8 01/04/2022 03:20 AM    MCHC 32.3 (L) 01/04/2022 03:20 AM    MPV 11.1 01/04/2022 03:20 AM    NEUTSPOLYS 75.40 (H) 01/04/2022 03:20 AM    LYMPHOCYTES 14.90 (L) 01/04/2022 03:20 AM    MONOCYTES 7.90 01/04/2022 03:20 AM    EOSINOPHILS 0.60 01/04/2022 03:20 AM    BASOPHILS 0.70 01/04/2022 03:20 AM          Lab Results   Component Value Date/Time    TROPONINT 51 (H) 01/03/2022 1600     Lab Results   Component Value Date/Time    NTPROBNP 7400 (H) 01/03/2022 2050       Cardiac Imaging and Procedures Review:    EKG has mentioned in the HPI    Radiology test Review:  DX-CHEST-PORTABLE (1 VIEW)   Final Result      1.  Pulmonary vascular congestion and minimal bilateral pleural effusions.  No overt pulmonary edema.   2.  Hazy RIGHT lung base opacity, atelectasis versus pneumonia.   3.  Mild cardiomegaly.      EC-ECHOCARDIOGRAM COMPLETE W/O CONT    (Results Pending)         Impression and Medical Decision Making:  #New onset atrial fibrillation  #A. fib with RVR  #Significant fluid overload concerning for congestive heart failure  #Hypokalemia  #Acute kidney injury  #Essential hypertension  #Dyslipidemia  #Stroke    Recommendations:  --Patient is asymptomatic from atrial fibrillation and does not report palpitations despite being given RVR.  Appears to be that the patient has been in RVR for several months which is likely contributed to volume overload and perhaps tachycardia induced cardiomyopathy.  Obtain transthoracic echocardiogram to evaluate underlying cardiac structure and function.  --Discussed different treatment options with the patient in terms of rate vs rhythm control strategy.  Given that this is new onset, discussed option to restore sinus rhythm.  After shared decision making, patient is in agreement to proceed  with the cardioversion.  Given that patient has been in atrial fibrillation for unknown length of time, will perform SABRINA to rule out thrombus prior to cardioversion.  Will also evaluate underlying cardiac structure and function with the echocardiogram and to evaluate LV systolic function.    The risks, benefits, and alternatives to electrical cardioversion were discussed in great detail. We discussed that conversion of atrial fibrillation to normal rhythm, at least transiently, is successful in 90 to 95% of patients. However, maintaining a normal rhythm depends on a number of factors, including underlying heart disease and antiarrhythmic medications. Atrial fibrillation often recurs with time and other treatments may be necessary. Risks of  cardioversion are low as long as anticoagulation issues are handled appropriately. There is a small (less than 1%) risk of embolic events, including stroke. Risks of electrical shock include mild muscle soreness and mild skin burning at the site of electrode placement. There is also a risk that cardioversion can stimulate more dangerous arrhythmias. The patient verbalized understanding of these potential complications and wishes to proceed with this procedure.  Patient understands that he will need to be on uninterrupted oral anticoagulation for 4 weeks post cardioversion.  No bleeding concerns noted.  --Patient's [id number] score is 3 (1 for hypertension, 2 for stroke).  Discussed initiating oral anticoagulation with Eliquis 5 mg twice daily.  --Received one-time dose of IV Lasix 80 mg yesterday and 40 mg earlier today.  Is still quite volume overloaded, hence, increase diuresis to IV Lasix 80 mg twice daily and monitor urine output.  Goal urine output 100 to 200 cc/h  --Replace potassium.  Monitor electrolytes while patient is being aggressively diuresed with goal K>4 and Mg>2.  --Obtain lipid panel.  Continue Crestor 10 mg daily.  --Given volume overload and concern for CHF,  would recommend discontinuing diltiazem and initiating metoprolol 50 mg twice daily for rate control.      Recommendations discussed with Dr. Medina.    Thank you for allowing me to participate in the care of this patient, cardiology will continue to follow.  Please contact me with any questions.      Magnus Porter M.D.  Cardiologist, [facility] [place]   [phone]    This note was generated using voice recognition software which has a small chance of producing errors of grammar and possibly content. I have made every reasonable attempt to find and correct any obvious errors, but expect that some may not be found prior to finalization of this note.

## 2022-01-04 NOTE — PROGRESS NOTES
Hospital Medicine Daily Progress Note    Date of Service  1/4/2022    Chief Complaint  Miah Oneill is a 66 y.o. male admitted 1/3/2022 with sob    Hospital Course  66 y.o. male history of hypertension hyperlipidemia who presented 1/3/2022 with dyspnea, coughing, BLE swelling and weight gain 20 lbs in the past week.   He is noted to have new onset A. fib with RVR and CHAYO.  Mr. Oneill states that he has been feeling chest congestion since August.  Patient is pretty adamant that he has a bronchitis/pneumonia since August, which is progressively worsening.  He has associated orthopnea, paroxysmal nocturnal dyspnea, nocturnal hypoxia noted on home overnight pulse ox evaluation.  He is very uncomfortable when he is sitting down due to the swelling and is unable to lie flat.  He denies ever having any chest pain.    Interval Problem Update  Reports sob, BLE swelling improving. Denies chest pain. Denies hx of cardiac disease.   Echo pending. Procal normal  On lasix  Cardiology consulted.  Still noted Afib RVR on monitor    All Data, Medication data reviewed.  I have personally seen and examined the patient at bedside. I discussed the plan of care with patient, family and bedside RN.    Consultants/Specialty  cardiology    Code Status  Full Code    Disposition  Patient is not medically cleared for discharge.   Anticipate discharge to to home with close outpatient follow-up.  I have placed the appropriate orders for post-discharge needs.    Review of Systems  Review of Systems   Constitutional: Positive for malaise/fatigue.   Respiratory: Positive for cough, sputum production and shortness of breath.    Cardiovascular: Positive for leg swelling. Negative for chest pain.   All other systems reviewed and are negative.       Physical Exam  Temp:  [36.5 °C (97.7 °F)-36.7 °C (98.1 °F)] 36.7 °C (98.1 °F)  Pulse:  [] 114  Resp:  [13-30] 18  BP: ()/() 160/120  SpO2:  [87 %-99 %] 94 %    Physical  Exam  Vitals and nursing note reviewed.   Constitutional:       General: He is not in acute distress.     Appearance: Normal appearance.   HENT:      Head: Normocephalic and atraumatic.      Mouth/Throat:      Pharynx: Oropharynx is clear.   Eyes:      Pupils: Pupils are equal, round, and reactive to light.   Neck:      Vascular: No carotid bruit.   Cardiovascular:      Rate and Rhythm: Tachycardia present. Rhythm irregular.   Pulmonary:      Effort: Pulmonary effort is normal. No respiratory distress.      Breath sounds: Rales present. No wheezing.      Comments: On oxygen supplement  Abdominal:      General: Abdomen is flat. Bowel sounds are normal. There is no distension.      Palpations: Abdomen is soft. There is no mass.      Tenderness: There is no abdominal tenderness.   Musculoskeletal:         General: Swelling present. Normal range of motion.      Cervical back: Neck supple.      Right lower leg: Edema present.      Left lower leg: Edema present.   Skin:     General: Skin is warm and dry.   Neurological:      General: No focal deficit present.      Mental Status: He is alert and oriented to person, place, and time.   Psychiatric:         Mood and Affect: Mood normal.         Behavior: Behavior normal.         Fluids    Intake/Output Summary (Last 24 hours) at 1/4/2022 1221  Last data filed at 1/3/2022 2149  Gross per 24 hour   Intake 100 ml   Output --   Net 100 ml       Laboratory  Recent Labs     01/03/22  1600 01/04/22  0320   WBC 10.5 12.1*   RBC 5.05 4.77   HEMOGLOBIN 15.2 14.2   HEMATOCRIT 46.3 44.0   MCV 91.7 92.2   MCH 30.1 29.8   MCHC 32.8* 32.3*   RDW 48.8 49.3   PLATELETCT 298 277   MPV 11.3 11.1     Recent Labs     01/03/22  1600 01/04/22  0320   SODIUM 141 140   POTASSIUM 4.3 3.7   CHLORIDE 102 102   CO2 26 23   GLUCOSE 85 100*   BUN 35* 39*   CREATININE 1.62* 1.55*   CALCIUM 9.3 8.4*                   Imaging  DX-CHEST-PORTABLE (1 VIEW)   Final Result      1.  Pulmonary vascular congestion  and minimal bilateral pleural effusions.  No overt pulmonary edema.   2.  Hazy RIGHT lung base opacity, atelectasis versus pneumonia.   3.  Mild cardiomegaly.      EC-ECHOCARDIOGRAM COMPLETE W/O CONT    (Results Pending)        Assessment/Plan  * Atrial fibrillation with rapid ventricular response (HCC)- (present on admission)  Assessment & Plan  In setting of volume overload, unclear if undiagnosed CHF led to volume overload and RVR or if uncontrolled tachyarrhythmia causing congestive heart failure.  Check TSH  Diltiazem 60 mg p.o. every 6 hours  Monitor on telemetry  [id number] 3, started Lovenox  Cardiology consulted  Echo    Elevated troponin- (present on admission)  Assessment & Plan  Initial troponin 51, proBNP 7400.  No chest pain  EKG shows A. fib with RVR, T wave abnormality leads V4 through 6, no ST segment elevation or depressions.  Continue aspirin, trend troponin.  TTE.  Repeat EKG with any chest pain    Volume overload- (present on admission)  Assessment & Plan  In setting of A. fib with RVR.  Diuresis with INR monitoring  Monitor electrolytes  TTE  Cardiology consulted    Acute respiratory failure with hypoxia (HCC)- (present on admission)  Assessment & Plan  Denies on home oxygen, likely sec to volume overload state and Afib with RVR  Lasix iv  Oxygen per guidelines wean as able  Continuous pulse ox  Incentive spirometer  DuoNebs as needed  Procalcitonin within normal limits    CHAYO (acute kidney injury) (HCC)- (present on admission)  Assessment & Plan  Suspecting Prerenal from vascular congestion from volume overload  FeNa studies ordered  U/a & CPK  Rule out post obstruction  Patient receiving diuresis for volume overload state, monitor renal function closely appears to have improved with diuresis.  Renal dose meds and avoid nephrotoxins  Monitor I&O's  Follow renal function    Primary hypertension- (present on admission)  Assessment & Plan  Hold home Cozaar, starting diltiazem for A. fib with  RVR.  Diuresis Lasix 40 mg twice daily  IVF antihypertensives with parameters    Dyslipidemia- (present on admission)  Assessment & Plan  Continue Crestor       VTE prophylaxis: therapeutic anticoagulation with lovenox    I have performed a physical exam and reviewed and updated ROS and Plan today (1/4/2022). In review of yesterday's note (1/3/2022), there are no changes except as documented above.

## 2022-01-04 NOTE — ED NOTES
Rounded on PT. Patient resting in bed. Emptied PT urinal. PT updated on wait for room upstair with admitting to hospital .

## 2022-01-04 NOTE — ED PROVIDER NOTES
"ED Provider Note    CHIEF COMPLAINT  Chief Complaint   Patient presents with   • Shortness of Breath     progressively worse x3 months. Started on Prednisone by his NP and given an albuterol inhaler. Pt had home oxygen monitoring and states his oxygen saturations dip below 80% while sleeping. Pt states he has also been retaining fluid since starting Prednisone.     • Leg Swelling       HPI  Miah Oneill is a 66 y.o. male here for evaluation of worsening shortness of breath.  Patient states he has had shortness of breath over the last 3 months, but it is \"getting worse.\"  He has no fever chills or vomiting, has no chest pain.  States that he has no abdominal pain or neck pain.  Nothing seems alleviate exacerbate his symptoms.  He states he has a lengthy history of the same.  He has had some bilateral lower leg swelling, has been on a \"water pill\" but this is not working for him.      ROS  See HPI for further details, o/w negative.     PAST MEDICAL HISTORY   No bleeding disorders    SOCIAL HISTORY  Social History     Tobacco Use   • Smoking status: Former Smoker     Quit date:      Years since quittin.0   • Smokeless tobacco: Never Used   Substance and Sexual Activity   • Alcohol use: Yes     Comment: occasionally   • Drug use: Not Currently   • Sexual activity: Not on file       Family History  No bleeding disorders    SURGICAL HISTORY  patient denies any surgical history    CURRENT MEDICATIONS  Home Medications     Reviewed by Michelle Kessler R.N. (Registered Nurse) on 22 at 1359  Med List Status: Partial   Medication Last Dose Status   albuterol 108 (90 Base) MCG/ACT Aero Soln inhalation aerosol  Active   furosemide (LASIX) 40 MG Tab  Active   losartan (COZAAR) 50 MG Tab  Active   rosuvastatin (CRESTOR) 10 MG Tab  Active                ALLERGIES  No Known Allergies    REVIEW OF SYSTEMS  See HPI for further details. Review of systems as above, otherwise all other systems are negative. "     PHYSICAL EXAM  Constitutional: Well developed, well nourished. No acute distress.  HEENT: Normocephalic, atraumatic. Posterior pharynx clear and moist.  Eyes:  EOMI. Normal sclera.  Neck: Supple, Full range of motion, nontender.  Chest/Pulmonary:   Diminished breath sounds, equal expansion.  Cardio: Regular rate and rhythm with no murmur.   Abdomen: Soft, nontender. No peritoneal signs. No guarding. No palpable masses.  Musculoskeletal: No deformity, 2+ pitting edema, neurovascular intact.   Neuro: Clear speech, appropriate, cooperative, cranial nerves II-XII grossly intact.  Psych: Normal mood and affect    PROCEDURES     MEDICAL RECORD  I have reviewed patient's medical record and pertinent results are listed.    COURSE & MEDICAL DECISION MAKING  I have reviewed any medical record information, laboratory studies and radiographic results as noted above.    Results for orders placed or performed during the hospital encounter of 01/03/22   CBC with Differential   Result Value Ref Range    WBC 10.5 4.8 - 10.8 K/uL    RBC 5.05 4.70 - 6.10 M/uL    Hemoglobin 15.2 14.0 - 18.0 g/dL    Hematocrit 46.3 42.0 - 52.0 %    MCV 91.7 81.4 - 97.8 fL    MCH 30.1 27.0 - 33.0 pg    MCHC 32.8 (L) 33.7 - 35.3 g/dL    RDW 48.8 35.9 - 50.0 fL    Platelet Count 298 164 - 446 K/uL    MPV 11.3 9.0 - 12.9 fL    Neutrophils-Polys 70.80 44.00 - 72.00 %    Lymphocytes 16.30 (L) 22.00 - 41.00 %    Monocytes 10.40 0.00 - 13.40 %    Eosinophils 1.20 0.00 - 6.90 %    Basophils 0.80 0.00 - 1.80 %    Immature Granulocytes 0.50 0.00 - 0.90 %    Nucleated RBC 0.00 /100 WBC    Neutrophils (Absolute) 7.46 (H) 1.82 - 7.42 K/uL    Lymphs (Absolute) 1.72 1.00 - 4.80 K/uL    Monos (Absolute) 1.09 (H) 0.00 - 0.85 K/uL    Eos (Absolute) 0.13 0.00 - 0.51 K/uL    Baso (Absolute) 0.08 0.00 - 0.12 K/uL    Immature Granulocytes (abs) 0.05 0.00 - 0.11 K/uL    NRBC (Absolute) 0.00 K/uL   Comp Metabolic Panel   Result Value Ref Range    Sodium 141 135 - 145 mmol/L     Potassium 4.3 3.6 - 5.5 mmol/L    Chloride 102 96 - 112 mmol/L    Co2 26 20 - 33 mmol/L    Anion Gap 13.0 7.0 - 16.0    Glucose 85 65 - 99 mg/dL    Bun 35 (H) 8 - 22 mg/dL    Creatinine 1.62 (H) 0.50 - 1.40 mg/dL    Calcium 9.3 8.5 - 10.5 mg/dL    AST(SGOT) 41 12 - 45 U/L    ALT(SGPT) 49 2 - 50 U/L    Alkaline Phosphatase 117 (H) 30 - 99 U/L    Total Bilirubin 1.2 0.1 - 1.5 mg/dL    Albumin 3.7 3.2 - 4.9 g/dL    Total Protein 6.0 6.0 - 8.2 g/dL    Globulin 2.3 1.9 - 3.5 g/dL    A-G Ratio 1.6 g/dL   ESTIMATED GFR   Result Value Ref Range    GFR If  52 (A) >60 mL/min/1.73 m 2    GFR If Non  43 (A) >60 mL/min/1.73 m 2   TROPONIN   Result Value Ref Range    Troponin T 51 (H) 6 - 19 ng/L   proBrain Natriuretic Peptide, NT   Result Value Ref Range    NT-proBNP 7400 (H) 0 - 125 pg/mL   LACTIC ACID   Result Value Ref Range    Lactic Acid 1.5 0.5 - 2.0 mmol/L   EKG   Result Value Ref Range    Report       [facility] Emergency Dept.    Test Date:  2022  Pt Name:    MILLY KINSEY               Department: ER  MRN:        [id number]                      Room:  Gender:     Male                         Technician: [id number]  :        1955                   Requested By:ER TRIAGE PROTOCOL  Order #:    [id number]                    Reading MD:    Measurements  Intervals                                Axis  Rate:       135                          P:  ME:                                      QRS:        -63  QRSD:       77                           T:          160  QT:         306  QTc:        459    Interpretive Statements  Atrial fibrillation  Ventricular premature complex  Inferior infarct, old  Consider anterior infarct  Lateral leads are also involved  No previous ECG available for comparison       DX-CHEST-PORTABLE (1 VIEW)   Final Result      1.  Pulmonary vascular congestion and minimal bilateral pleural effusions.  No overt pulmonary edema.   2.  Hazy RIGHT lung  base opacity, atelectasis versus pneumonia.   3.  Mild cardiomegaly.      EC-ECHOCARDIOGRAM COMPLETE W/O CONT    (Results Pending)     Ekg; irregularly irregular at 135.  No ST elevation or depression.  QTC is 459.  No comparison EKG available.    The pt has some improvement after his cardizem bolus. The has no current chest pain.  The pt has improved from the 150s to the high 90s after the bolus.     The pt agrees to be admitted.  He will need to be evaluated by cards, or at least a referral.       Patient will need to be admitted to the hospital service for further evaluation.  We did give a bolus dose of Cardizem that did bring the patient's heart rate down from the 150s to the high 90s and low 100s.  He states he does feel better after this.  He also has blood cultures drawn and was treated for a pneumonia because of his shortness of breath and his hazy right lung base.    CRITICAL CARE  The very real possibility of a deterioration of this patient's condition required the highest level of my preparedness for sudden, emergent intervention.  I provided critical care services, which included medication orders, frequent reevaluations of the patient's condition and response to treatment, ordering and reviewing test results, and discussing the case with various consultants.  The critical care time associated with the care of the patient was 46 minutes. Review chart for interventions. This time is exclusive of any other billable procedures.           FINAL IMPRESSION  New onset atrial fibrillation  merary  Dyspnea  Pneumonia   Critical care time 46 minutes.         Electronically signed by: Stevo Tolentino D.O., 1/3/2022 8:40 PM

## 2022-01-04 NOTE — ED NOTES
Rounded on PT. Emptied PT urinal. PT updated on plan of care and calling for assistance. PT verbalize understanding of plan and calling for assistance.

## 2022-01-04 NOTE — ED NOTES
Assist RN: Patient ambulatory to [facility] for triage complaint. Pt placed in gown and placed on monitor.

## 2022-01-05 ENCOUNTER — APPOINTMENT (OUTPATIENT)
Dept: CARDIOLOGY | Facility: MEDICAL CENTER | Age: 67
DRG: 286 | End: 2022-01-05
Attending: INTERNAL MEDICINE
Payer: MEDICARE

## 2022-01-05 ENCOUNTER — ANESTHESIA EVENT (OUTPATIENT)
Dept: CARDIOLOGY | Facility: MEDICAL CENTER | Age: 67
DRG: 286 | End: 2022-01-05
Payer: MEDICARE

## 2022-01-05 ENCOUNTER — ANESTHESIA (OUTPATIENT)
Dept: CARDIOLOGY | Facility: MEDICAL CENTER | Age: 67
DRG: 286 | End: 2022-01-05
Payer: MEDICARE

## 2022-01-05 ENCOUNTER — APPOINTMENT (OUTPATIENT)
Dept: RADIOLOGY | Facility: MEDICAL CENTER | Age: 67
DRG: 286 | End: 2022-01-05
Attending: STUDENT IN AN ORGANIZED HEALTH CARE EDUCATION/TRAINING PROGRAM
Payer: MEDICARE

## 2022-01-05 PROBLEM — I50.21 ACUTE SYSTOLIC HEART FAILURE (HCC): Status: ACTIVE | Noted: 2022-01-04

## 2022-01-05 LAB
ANION GAP SERPL CALC-SCNC: 14 MMOL/L (ref 7–16)
ANION GAP SERPL CALC-SCNC: 18 MMOL/L (ref 7–16)
BASOPHILS # BLD AUTO: 0.6 % (ref 0–1.8)
BASOPHILS # BLD: 0.08 K/UL (ref 0–0.12)
BUN SERPL-MCNC: 48 MG/DL (ref 8–22)
BUN SERPL-MCNC: 48 MG/DL (ref 8–22)
CALCIUM SERPL-MCNC: 9.2 MG/DL (ref 8.5–10.5)
CALCIUM SERPL-MCNC: 9.2 MG/DL (ref 8.5–10.5)
CHLORIDE SERPL-SCNC: 96 MMOL/L (ref 96–112)
CHLORIDE SERPL-SCNC: 98 MMOL/L (ref 96–112)
CO2 SERPL-SCNC: 21 MMOL/L (ref 20–33)
CO2 SERPL-SCNC: 28 MMOL/L (ref 20–33)
CREAT SERPL-MCNC: 2.35 MG/DL (ref 0.5–1.4)
CREAT SERPL-MCNC: 2.39 MG/DL (ref 0.5–1.4)
CREAT UR-MCNC: 29.81 MG/DL
EKG IMPRESSION: NORMAL
EOSINOPHIL # BLD AUTO: 0.02 K/UL (ref 0–0.51)
EOSINOPHIL NFR BLD: 0.1 % (ref 0–6.9)
ERYTHROCYTE [DISTWIDTH] IN BLOOD BY AUTOMATED COUNT: 47.9 FL (ref 35.9–50)
GLUCOSE SERPL-MCNC: 84 MG/DL (ref 65–99)
GLUCOSE SERPL-MCNC: 94 MG/DL (ref 65–99)
HCT VFR BLD AUTO: 45.4 % (ref 42–52)
HGB BLD-MCNC: 14.7 G/DL (ref 14–18)
IMM GRANULOCYTES # BLD AUTO: 0.12 K/UL (ref 0–0.11)
IMM GRANULOCYTES NFR BLD AUTO: 0.9 % (ref 0–0.9)
LYMPHOCYTES # BLD AUTO: 1.45 K/UL (ref 1–4.8)
LYMPHOCYTES NFR BLD: 10.8 % (ref 22–41)
MCH RBC QN AUTO: 29.6 PG (ref 27–33)
MCHC RBC AUTO-ENTMCNC: 32.4 G/DL (ref 33.7–35.3)
MCV RBC AUTO: 91.3 FL (ref 81.4–97.8)
MONOCYTES # BLD AUTO: 0.93 K/UL (ref 0–0.85)
MONOCYTES NFR BLD AUTO: 7 % (ref 0–13.4)
NEUTROPHILS # BLD AUTO: 10.77 K/UL (ref 1.82–7.42)
NEUTROPHILS NFR BLD: 80.6 % (ref 44–72)
NRBC # BLD AUTO: 0 K/UL
NRBC BLD-RTO: 0 /100 WBC
PLATELET # BLD AUTO: 286 K/UL (ref 164–446)
PMV BLD AUTO: 11.8 FL (ref 9–12.9)
POTASSIUM SERPL-SCNC: 4.1 MMOL/L (ref 3.6–5.5)
POTASSIUM SERPL-SCNC: 4.2 MMOL/L (ref 3.6–5.5)
RBC # BLD AUTO: 4.97 M/UL (ref 4.7–6.1)
SODIUM SERPL-SCNC: 135 MMOL/L (ref 135–145)
SODIUM SERPL-SCNC: 140 MMOL/L (ref 135–145)
SODIUM UR-SCNC: 88 MMOL/L
WBC # BLD AUTO: 13.4 K/UL (ref 4.8–10.8)

## 2022-01-05 PROCEDURE — 84300 ASSAY OF URINE SODIUM: CPT

## 2022-01-05 PROCEDURE — A9270 NON-COVERED ITEM OR SERVICE: HCPCS | Performed by: INTERNAL MEDICINE

## 2022-01-05 PROCEDURE — 770020 HCHG ROOM/CARE - TELE (206)

## 2022-01-05 PROCEDURE — 99233 SBSQ HOSP IP/OBS HIGH 50: CPT | Mod: FS,25 | Performed by: PHYSICIAN ASSISTANT

## 2022-01-05 PROCEDURE — 76775 US EXAM ABDO BACK WALL LIM: CPT

## 2022-01-05 PROCEDURE — 92960 CARDIOVERSION ELECTRIC EXT: CPT | Performed by: INTERNAL MEDICINE

## 2022-01-05 PROCEDURE — 700111 HCHG RX REV CODE 636 W/ 250 OVERRIDE (IP): Performed by: EMERGENCY MEDICINE

## 2022-01-05 PROCEDURE — 80048 BASIC METABOLIC PNL TOTAL CA: CPT

## 2022-01-05 PROCEDURE — 160035 HCHG PACU - 1ST 60 MINS PHASE I

## 2022-01-05 PROCEDURE — 700105 HCHG RX REV CODE 258: Performed by: EMERGENCY MEDICINE

## 2022-01-05 PROCEDURE — 93312 ECHO TRANSESOPHAGEAL: CPT

## 2022-01-05 PROCEDURE — 93010 ELECTROCARDIOGRAM REPORT: CPT | Mod: 59 | Performed by: INTERNAL MEDICINE

## 2022-01-05 PROCEDURE — A9270 NON-COVERED ITEM OR SERVICE: HCPCS | Performed by: STUDENT IN AN ORGANIZED HEALTH CARE EDUCATION/TRAINING PROGRAM

## 2022-01-05 PROCEDURE — 700102 HCHG RX REV CODE 250 W/ 637 OVERRIDE(OP): Performed by: STUDENT IN AN ORGANIZED HEALTH CARE EDUCATION/TRAINING PROGRAM

## 2022-01-05 PROCEDURE — 93005 ELECTROCARDIOGRAM TRACING: CPT | Performed by: INTERNAL MEDICINE

## 2022-01-05 PROCEDURE — 700111 HCHG RX REV CODE 636 W/ 250 OVERRIDE (IP): Performed by: INTERNAL MEDICINE

## 2022-01-05 PROCEDURE — 99233 SBSQ HOSP IP/OBS HIGH 50: CPT | Performed by: STUDENT IN AN ORGANIZED HEALTH CARE EDUCATION/TRAINING PROGRAM

## 2022-01-05 PROCEDURE — 92960 CARDIOVERSION ELECTRIC EXT: CPT

## 2022-01-05 PROCEDURE — 82570 ASSAY OF URINE CREATININE: CPT

## 2022-01-05 PROCEDURE — 93312 ECHO TRANSESOPHAGEAL: CPT | Mod: 26 | Performed by: INTERNAL MEDICINE

## 2022-01-05 PROCEDURE — 85025 COMPLETE CBC W/AUTO DIFF WBC: CPT

## 2022-01-05 PROCEDURE — 5A2204Z RESTORATION OF CARDIAC RHYTHM, SINGLE: ICD-10-PCS | Performed by: INTERNAL MEDICINE

## 2022-01-05 PROCEDURE — 160002 HCHG RECOVERY MINUTES (STAT)

## 2022-01-05 PROCEDURE — 700102 HCHG RX REV CODE 250 W/ 637 OVERRIDE(OP): Performed by: INTERNAL MEDICINE

## 2022-01-05 PROCEDURE — 93320 DOPPLER ECHO COMPLETE: CPT | Mod: 26 | Performed by: INTERNAL MEDICINE

## 2022-01-05 PROCEDURE — 700101 HCHG RX REV CODE 250: Performed by: EMERGENCY MEDICINE

## 2022-01-05 PROCEDURE — 36415 COLL VENOUS BLD VENIPUNCTURE: CPT

## 2022-01-05 RX ORDER — SODIUM CHLORIDE, SODIUM LACTATE, POTASSIUM CHLORIDE, AND CALCIUM CHLORIDE .6; .31; .03; .02 G/100ML; G/100ML; G/100ML; G/100ML
500 INJECTION, SOLUTION INTRAVENOUS ONCE
Status: DISCONTINUED | OUTPATIENT
Start: 2022-01-05 | End: 2022-01-05 | Stop reason: HOSPADM

## 2022-01-05 RX ORDER — HYDROMORPHONE HYDROCHLORIDE 1 MG/ML
0.1 INJECTION, SOLUTION INTRAMUSCULAR; INTRAVENOUS; SUBCUTANEOUS
Status: DISCONTINUED | OUTPATIENT
Start: 2022-01-05 | End: 2022-01-05 | Stop reason: HOSPADM

## 2022-01-05 RX ORDER — HYDRALAZINE HYDROCHLORIDE 20 MG/ML
5 INJECTION INTRAMUSCULAR; INTRAVENOUS
Status: DISCONTINUED | OUTPATIENT
Start: 2022-01-05 | End: 2022-01-05 | Stop reason: HOSPADM

## 2022-01-05 RX ORDER — OXYCODONE HYDROCHLORIDE AND ACETAMINOPHEN 5; 325 MG/1; MG/1
1 TABLET ORAL
Status: DISCONTINUED | OUTPATIENT
Start: 2022-01-05 | End: 2022-01-05 | Stop reason: HOSPADM

## 2022-01-05 RX ORDER — LABETALOL HYDROCHLORIDE 5 MG/ML
5 INJECTION, SOLUTION INTRAVENOUS
Status: DISCONTINUED | OUTPATIENT
Start: 2022-01-05 | End: 2022-01-05 | Stop reason: HOSPADM

## 2022-01-05 RX ORDER — HALOPERIDOL 5 MG/ML
1 INJECTION INTRAMUSCULAR
Status: DISCONTINUED | OUTPATIENT
Start: 2022-01-05 | End: 2022-01-05 | Stop reason: HOSPADM

## 2022-01-05 RX ORDER — LIDOCAINE HYDROCHLORIDE 20 MG/ML
INJECTION, SOLUTION EPIDURAL; INFILTRATION; INTRACAUDAL; PERINEURAL PRN
Status: DISCONTINUED | OUTPATIENT
Start: 2022-01-05 | End: 2022-01-05 | Stop reason: SURG

## 2022-01-05 RX ORDER — HYDROMORPHONE HYDROCHLORIDE 1 MG/ML
0.4 INJECTION, SOLUTION INTRAMUSCULAR; INTRAVENOUS; SUBCUTANEOUS
Status: DISCONTINUED | OUTPATIENT
Start: 2022-01-05 | End: 2022-01-05 | Stop reason: HOSPADM

## 2022-01-05 RX ORDER — FUROSEMIDE 10 MG/ML
80 INJECTION INTRAMUSCULAR; INTRAVENOUS
Status: DISCONTINUED | OUTPATIENT
Start: 2022-01-05 | End: 2022-01-07

## 2022-01-05 RX ORDER — DIPHENHYDRAMINE HYDROCHLORIDE 50 MG/ML
12.5 INJECTION INTRAMUSCULAR; INTRAVENOUS
Status: DISCONTINUED | OUTPATIENT
Start: 2022-01-05 | End: 2022-01-05 | Stop reason: HOSPADM

## 2022-01-05 RX ORDER — OXYCODONE HYDROCHLORIDE AND ACETAMINOPHEN 5; 325 MG/1; MG/1
2 TABLET ORAL
Status: DISCONTINUED | OUTPATIENT
Start: 2022-01-05 | End: 2022-01-05 | Stop reason: HOSPADM

## 2022-01-05 RX ORDER — FUROSEMIDE 10 MG/ML
120 INJECTION INTRAMUSCULAR; INTRAVENOUS ONCE
Status: COMPLETED | OUTPATIENT
Start: 2022-01-05 | End: 2022-01-05

## 2022-01-05 RX ORDER — MEPERIDINE HYDROCHLORIDE 25 MG/ML
6.25 INJECTION INTRAMUSCULAR; INTRAVENOUS; SUBCUTANEOUS
Status: DISCONTINUED | OUTPATIENT
Start: 2022-01-05 | End: 2022-01-05 | Stop reason: HOSPADM

## 2022-01-05 RX ORDER — ONDANSETRON 2 MG/ML
4 INJECTION INTRAMUSCULAR; INTRAVENOUS
Status: DISCONTINUED | OUTPATIENT
Start: 2022-01-05 | End: 2022-01-05 | Stop reason: HOSPADM

## 2022-01-05 RX ORDER — SODIUM CHLORIDE 9 MG/ML
INJECTION, SOLUTION INTRAVENOUS
Status: DISCONTINUED | OUTPATIENT
Start: 2022-01-05 | End: 2022-01-05 | Stop reason: SURG

## 2022-01-05 RX ORDER — HYDROMORPHONE HYDROCHLORIDE 1 MG/ML
0.2 INJECTION, SOLUTION INTRAMUSCULAR; INTRAVENOUS; SUBCUTANEOUS
Status: DISCONTINUED | OUTPATIENT
Start: 2022-01-05 | End: 2022-01-05 | Stop reason: HOSPADM

## 2022-01-05 RX ADMIN — SODIUM CHLORIDE: 9 INJECTION, SOLUTION INTRAVENOUS at 15:18

## 2022-01-05 RX ADMIN — POTASSIUM CHLORIDE 40 MEQ: 1500 TABLET, EXTENDED RELEASE ORAL at 05:07

## 2022-01-05 RX ADMIN — METOPROLOL TARTRATE 50 MG: 50 TABLET, FILM COATED ORAL at 17:22

## 2022-01-05 RX ADMIN — PROPOFOL 20 MG: 10 INJECTION, EMULSION INTRAVENOUS at 15:26

## 2022-01-05 RX ADMIN — PROPOFOL 30 MG: 10 INJECTION, EMULSION INTRAVENOUS at 15:33

## 2022-01-05 RX ADMIN — FUROSEMIDE 80 MG: 10 INJECTION, SOLUTION INTRAMUSCULAR; INTRAVENOUS at 10:10

## 2022-01-05 RX ADMIN — APIXABAN 5 MG: 5 TABLET, FILM COATED ORAL at 05:06

## 2022-01-05 RX ADMIN — BENZOCAINE AND MENTHOL 1 LOZENGE: 15; 3.6 LOZENGE ORAL at 00:20

## 2022-01-05 RX ADMIN — PROPOFOL 20 MG: 10 INJECTION, EMULSION INTRAVENOUS at 15:30

## 2022-01-05 RX ADMIN — PROPOFOL 30 MG: 10 INJECTION, EMULSION INTRAVENOUS at 15:24

## 2022-01-05 RX ADMIN — FUROSEMIDE 120 MG: 10 INJECTION INTRAMUSCULAR; INTRAVENOUS at 01:33

## 2022-01-05 RX ADMIN — METOPROLOL TARTRATE 50 MG: 50 TABLET, FILM COATED ORAL at 05:06

## 2022-01-05 RX ADMIN — FENTANYL CITRATE 50 MCG: 50 INJECTION, SOLUTION INTRAMUSCULAR; INTRAVENOUS at 15:33

## 2022-01-05 RX ADMIN — GUAIFENESIN 600 MG: 600 TABLET, EXTENDED RELEASE ORAL at 17:21

## 2022-01-05 RX ADMIN — PROPOFOL 30 MG: 10 INJECTION, EMULSION INTRAVENOUS at 15:39

## 2022-01-05 RX ADMIN — ASPIRIN 81 MG: 81 TABLET, COATED ORAL at 05:06

## 2022-01-05 RX ADMIN — APIXABAN 5 MG: 5 TABLET, FILM COATED ORAL at 17:20

## 2022-01-05 RX ADMIN — ROSUVASTATIN CALCIUM 10 MG: 20 TABLET, FILM COATED ORAL at 05:06

## 2022-01-05 RX ADMIN — MIDAZOLAM 2 MG: 1 INJECTION INTRAMUSCULAR; INTRAVENOUS at 15:32

## 2022-01-05 RX ADMIN — PROPOFOL 30 MG: 10 INJECTION, EMULSION INTRAVENOUS at 15:28

## 2022-01-05 RX ADMIN — LIDOCAINE HYDROCHLORIDE 100 MG: 20 INJECTION, SOLUTION EPIDURAL; INFILTRATION; INTRACAUDAL at 15:24

## 2022-01-05 ASSESSMENT — ENCOUNTER SYMPTOMS
WEIGHT LOSS: 1
DIZZINESS: 0
ORTHOPNEA: 1
SPUTUM PRODUCTION: 1
DIARRHEA: 1
COUGH: 1
PALPITATIONS: 0
SHORTNESS OF BREATH: 1

## 2022-01-05 ASSESSMENT — PAIN DESCRIPTION - PAIN TYPE: TYPE: ACUTE PAIN

## 2022-01-05 ASSESSMENT — COGNITIVE AND FUNCTIONAL STATUS - GENERAL
MOBILITY SCORE: 24
DAILY ACTIVITIY SCORE: 24
SUGGESTED CMS G CODE MODIFIER DAILY ACTIVITY: CH
SUGGESTED CMS G CODE MODIFIER MOBILITY: CH

## 2022-01-05 ASSESSMENT — PAIN SCALES - GENERAL: PAIN_LEVEL: 0

## 2022-01-05 ASSESSMENT — FIBROSIS 4 INDEX: FIB4 SCORE: 1.33

## 2022-01-05 NOTE — PROGRESS NOTES
[facility] Cardiology Progress Note    Name:   Miah Oneill   YOB: 1955  Age:   66 y.o.  male   MRN:   [id number]    Consulting Cardiologist: Dr Porter     Chief Complaint: progressive dyspnea, leg swelling      History of Present Illness:  66-year-old male who lives in [facility] presented to the hospital 1/3/2022 with 2-3 months of dyspnea, productive cough, 2 to 3 weeks of leg swelling and weight gain.  Found to be in atrial fibrillation with rapid ventricular response, transthoracic echocardiogram showed left and right ventricular heart failure, EF 20%, grade 2 diastolic dysfunction. He has been diuresed and scheduled for a SABRINA/CV today, .     Interim Events:   Overnight his BMP showed an increased SCr to 2.6 and initially his lasix was held, however cardiology evaluated him at bedside and found him to be heart failure, with worsening pulmonary edema on CXR. He was given lasix 120mg IV. Urine output documented does not seem accurate, weights have decreased nicely. This morning he is having diarrhea, he thinks from the IV lasix. He still has significant swelling, his respiratory status is improved although still hypoxic on room air.     N.p.o. this morning for planned cardioversion this afternoon.  Review of Systems   Constitutional: Positive for malaise/fatigue and weight loss.   Respiratory: Positive for cough, sputum production and shortness of breath.    Cardiovascular: Positive for orthopnea and leg swelling. Negative for chest pain and palpitations.   Gastrointestinal: Positive for diarrhea.   Genitourinary: Positive for frequency.   Neurological: Negative for dizziness.         History reviewed. No pertinent surgical history.    History reviewed. No pertinent family history.    Social History     Tobacco Use   Smoking Status Former Smoker   • Quit date:    • Years since quittin.0   Smokeless Tobacco Never Used       No Known Allergies      Medications   No  current facility-administered medications on file prior to encounter.     Current Outpatient Medications on File Prior to Encounter   Medication Sig Dispense Refill   • rosuvastatin (CRESTOR) 10 MG Tab Take 10 mg by mouth every day.     • losartan (COZAAR) 50 MG Tab Take 50 mg by mouth every day.     • furosemide (LASIX) 40 MG Tab Take 40 mg by mouth every day.     • albuterol 108 (90 Base) MCG/ACT Aero Soln inhalation aerosol Inhale 2 Puffs every 6 hours as needed for Shortness of Breath.             Physical Exam  Vitals:    01/05/22 1108   BP: 106/80   Pulse: 80   Resp: 17   Temp: 36.6 °C (97.8 °F)   SpO2: 94%     Physical Exam  Vitals and nursing note reviewed.   HENT:      Head: Normocephalic and atraumatic.   Cardiovascular:      Rate and Rhythm: Normal rate. Rhythm irregular.      Heart sounds: No murmur heard.      Pulmonary:      Breath sounds: Rales (bilateral bases) present.   Abdominal:      Tenderness: There is no abdominal tenderness.   Musculoskeletal:      Right lower leg: Edema present.      Left lower leg: Edema present.      Comments: 3+ pitting edema in pretibial area   Neurological:      Mental Status: He is alert. Mental status is at baseline.   Psychiatric:         Judgment: Judgment normal.           Labs (personally reviewed):     Lab Results   Component Value Date/Time    SODIUM 135 01/05/2022 03:02 AM    POTASSIUM 4.2 01/05/2022 03:02 AM    CHLORIDE 96 01/05/2022 03:02 AM    CO2 21 01/05/2022 03:02 AM    GLUCOSE 94 01/05/2022 03:02 AM    BUN 48 (H) 01/05/2022 03:02 AM    CREATININE 2.35 (H) 01/05/2022 03:02 AM     No results found for: CHOLSTRLTOT, LDL, HDL, TRIGLYCERIDE  No results found for: BNPBTYPENAT      Cardiac Imaging and Procedures Review:      Personal Telemetry Review: Afib       Echo 1/4/21:    No prior study is available for comparison.   The left ventricular ejection fraction is visually estimated to be 20%.  Grade II diastolic dysfunction.  The right ventricle is  dilated.  Reduced right ventricular systolic function.  Normal inferior vena cava size and inspiratory collapse.  Moderate mitral regurgitation.  Moderate tricuspid regurgitation.  Estimated right ventricular systolic pressure is 48  mmHg.       Assessment and Medical Decision Making:  #  Atrial fibrillation with RVR  - Plan for electrocardioversion today.  Risks and benefits have been discussed   - Likely will add a p.o. amnio load afterwards  -On metoprolol tartrate, will convert this to long-acting succinate tomorrow once he is better compensated  - [id number] at least 2, apixaban 5 mg twice daily    # Acute systolic heart failure, suspect secondary to tachycardia induced cardiomyopathy  - Rhythm control as above  -We will consider ischemic work-up either this hospitalization or as outpatient    # Acute kidney injury  # High Anion gap metabolic acidosis  -Recommend continuing high-dose IV diuretics as he is warm and wet on exam,pulmonary edema on x-ray  - check BMP this afternoon    Please see Dr Porter's attestation for additions and further recommendations.    Clarisse Morrow PA-C  [facility]

## 2022-01-05 NOTE — CARE PLAN
The patient is Stable - Low risk of patient condition declining or worsening    Shift Goals  Clinical Goals: pt will urinate    Progress made toward(s) clinical / shift goals:  Pt urinated 300mL    Patient is not progressing towards the following goals:      Problem: Knowledge Deficit - Standard  Goal: Patient and family/care givers will demonstrate understanding of plan of care, disease process/condition, diagnostic tests and medications  Outcome: Progressing

## 2022-01-05 NOTE — ANESTHESIA PREPROCEDURE EVALUATION
Date/Time: 01/05/22 1400    Scheduled providers: Magnus Porter M.D.; Mansoor White M.D.    Procedures:       EC-SABRINA W/O CONT      CL-CARDIOVERSION    Diagnosis:       Atrial fibrillation with rapid ventricular response (HCC) [I48.91]      Atrial fibrillation with rapid ventricular response (HCC) [I48.91]      Atrial fibrillation with rapid ventricular response (HCC) [I48.91]    Indications: See Assoicated Dx    Location: [facility] IMAGING - ECHOCARDIOLOGY [facility]          Relevant Problems   CARDIAC   (positive) Atrial fibrillation with rapid ventricular response (HCC)   (positive) Primary hypertension         (positive) CHAYO (acute kidney injury) (HCC)       Physical Exam    Airway   Mallampati: II  TM distance: >3 FB  Neck ROM: full       Cardiovascular - normal exam  Rhythm: irregular  Rate: normal  (-) murmur     Dental   (+) upper dentures        Facial Hair   Pulmonary - normal exam  Breath sounds clear to auscultation     Abdominal    Neurological - normal exam                 Anesthesia Plan    ASA 4       Plan - MAC               Induction: intravenous      Pertinent diagnostic labs and testing reviewed    Informed Consent:    Anesthetic plan and risks discussed with patient.

## 2022-01-05 NOTE — PROGRESS NOTES
Hospital Medicine Daily Progress Note    Date of Service  1/5/2022    Chief Complaint  Miah Oneill is a 66 y.o. male admitted 1/3/2022 with sob    Hospital Course  66 y.o. male history of hypertension and hyperlipidemia who presented 1/3/2022 with dyspnea, coughing, BLE swelling and weight gain 20 lbs in the past week.   He is noted to have new onset A. fib with RVR and CHAYO.  Mr. Oneill states that he has been feeling chest congestion since August.  Patient is pretty adamant that he has a bronchitis/pneumonia since August, which is progressively worsening.  He has associated orthopnea, paroxysmal nocturnal dyspnea, nocturnal hypoxia noted on home overnight pulse ox evaluation.   Cardiology was consulted and patient was started on oral metoprolol with plan for cardioversion. He was also started on aggressive diuresis due to volume overload. echocardiogram was done and showed severely reduced EF 20% possibly due to tachycardic induced cardiomyopathy.     Interval Problem Update  Patient seen and examined, he does feel improved but still unable to lie flat due to feelins of SOB. Denies chest pain or palpitations.   IV lasix resumed per cardiology   CHAYO, Cr up to 2.35, however is having improved urine output today, will continue and monitor renal function closely   Down 2kg since yesterday   Still in A.fib, rate improved, possible cardioversion today, NPO, on [facility]   Cardiology following, appreciate their help with management     All Data, Medication data reviewed.  I have personally seen and examined the patient at bedside. I discussed the plan of care with patient and bedside RN.    Consultants/Specialty  cardiology    Code Status  Full Code    Disposition  Patient is not medically cleared for discharge.   Anticipate discharge to to home with close outpatient follow-up.  I have placed the appropriate orders for post-discharge needs.    Review of Systems  Review of Systems   Constitutional: Positive  for malaise/fatigue.   Respiratory: Positive for cough, sputum production and shortness of breath.    Cardiovascular: Positive for leg swelling. Negative for chest pain.   All other systems reviewed and are negative.       Physical Exam  Temp:  [35.9 °C (96.7 °F)-37.3 °C (99.2 °F)] 36.6 °C (97.8 °F)  Pulse:  [] 80  Resp:  [17-28] 17  BP: (101-114)/(56-80) 106/80  SpO2:  [91 %-96 %] 94 %    Physical Exam  Vitals and nursing note reviewed.   Constitutional:       General: He is not in acute distress.     Appearance: Normal appearance. He is ill-appearing. He is not toxic-appearing.   HENT:      Head: Normocephalic and atraumatic.      Mouth/Throat:      Pharynx: Oropharynx is clear.   Eyes:      Extraocular Movements: Extraocular movements intact.      Conjunctiva/sclera: Conjunctivae normal.      Pupils: Pupils are equal, round, and reactive to light.   Neck:      Vascular: No carotid bruit.      Comments: JVD   Cardiovascular:      Rate and Rhythm: Tachycardia present. Rhythm irregular.      Heart sounds: No murmur heard.      Pulmonary:      Effort: Pulmonary effort is normal. No respiratory distress.      Breath sounds: No wheezing or rales.      Comments: On 3L NC, diminished bibasilar breath sounds with fine crackles  Abdominal:      General: Abdomen is flat. Bowel sounds are normal. There is no distension.      Palpations: Abdomen is soft. There is no mass.      Tenderness: There is no abdominal tenderness.   Musculoskeletal:         General: Normal range of motion.      Cervical back: Neck supple.      Right lower leg: Edema present.      Left lower leg: Edema present.      Comments: 3+ pitting edema up to knees   Skin:     General: Skin is warm and dry.   Neurological:      General: No focal deficit present.      Mental Status: He is alert and oriented to person, place, and time.   Psychiatric:         Mood and Affect: Mood normal.         Behavior: Behavior normal.         Fluids    Intake/Output  Summary (Last 24 hours) at 1/5/2022 1230  Last data filed at 1/5/2022 0603  Gross per 24 hour   Intake 700 ml   Output 1425 ml   Net -725 ml       Laboratory  Recent Labs     01/03/22  1600 01/04/22  0320 01/05/22  0302   WBC 10.5 12.1* 13.4*   RBC 5.05 4.77 4.97   HEMOGLOBIN 15.2 14.2 14.7   HEMATOCRIT 46.3 44.0 45.4   MCV 91.7 92.2 91.3   MCH 30.1 29.8 29.6   MCHC 32.8* 32.3* 32.4*   RDW 48.8 49.3 47.9   PLATELETCT 298 277 286   MPV 11.3 11.1 11.8     Recent Labs     01/04/22  0320 01/04/22 2025 01/05/22  0302   SODIUM 140 138 135   POTASSIUM 3.7 4.4 4.2   CHLORIDE 102 95* 96   CO2 23 18* 21   GLUCOSE 100* 171* 94   BUN 39* 47* 48*   CREATININE 1.55* 2.26* 2.35*   CALCIUM 8.4* 9.2 9.2                   Imaging  US-RENAL   Final Result      1.  No hydronephrosis.   2.  Bilateral pleural effusions.   3.  Trace pelvic free fluid.      DX-CHEST-LIMITED (1 VIEW)   Final Result      Increasing pulmonary airspace process consistent with worsening edema      EC-ECHOCARDIOGRAM COMPLETE W/O CONT   Final Result      DX-CHEST-PORTABLE (1 VIEW)   Final Result      1.  Pulmonary vascular congestion and minimal bilateral pleural effusions.  No overt pulmonary edema.   2.  Hazy RIGHT lung base opacity, atelectasis versus pneumonia.   3.  Mild cardiomegaly.      EC-SABRINA W/O CONT    (Results Pending)   CL-CARDIOVERSION    (Results Pending)        Assessment/Plan  * Atrial fibrillation with rapid ventricular response (HCC)- (present on admission)  Assessment & Plan  In setting of volume overload, unclear if undiagnosed CHF led to volume overload and RVR or if uncontrolled tachyarrhythmia causing congestive heart failure.  TSH normal   Diltiazem changed to metoprolol due to Reduced EF   Monitor on telemetry  [id number] 3, started on eliquis   Rate improving, plan for possible cardioversion, cardiology following appreciate help with management   Echo with EF 20%    Elevated troponin- (present on admission)  Assessment &  Plan  Initial troponin 51, proBNP 7400.  No chest pain  EKG shows A. fib with RVR, T wave abnormality leads V4 through 6, no ST segment elevation or depressions.  Continue aspirin, trend troponin.  TTE.  Repeat EKG with any chest pain    Acute systolic heart failure (HCC)- (present on admission)  Assessment & Plan  Patient presents with acute respiratory failure and volume overload In setting of A. fib with RVR  Possible tachycardic induced cardiomyopathy   Echo showing EF 20%, pt with severe edema, JVD and bilateral pleural effusions with ARF consistent with severe volume overload   Continue aggressive diuresis   Started on metoprolol 50 mg BID, lasix 80 mg BID, holding ACE/ARB due to CHAYO, will likely initiate spironolactone during admission   Monitor electrolytes  Cardiology following   Monitor Is/Os, fluid restriction, low salt diet, daily weights    Acute respiratory failure with hypoxia (HCC)- (present on admission)  Assessment & Plan  Secondary to volume overload/acute heart failure   Continue IV lasix   Oxygen per guidelines wean as able  Continuous pulse ox  Incentive spirometer  DuoNebs as needed  Procalcitonin within normal limits  Wean O2 as able     CHAYO (acute kidney injury) (HCC)- (present on admission)  Assessment & Plan  Suspecting Prerenal from vascular congestion from volume overload  U/a negative, CPK within goal  US negative for signs of obstruction, post void normal   Continue diuresis, if renal function continues to worsen, will discuss with nephrology   Renal dose meds and avoid nephrotoxins  Monitor I&O's  Follow renal function closely     Primary hypertension- (present on admission)  Assessment & Plan  Hold home Cozaar due to renal function   Metoprolol for  for A. fib with RVR.  Diuresis Lasix 80 mg BID   IVF antihypertensives with parameters    Dyslipidemia- (present on admission)  Assessment & Plan  Continue Crestor       VTE prophylaxis: therapeutic anticoagulation with eliquis    I have  performed a physical exam and reviewed and updated ROS and Plan today (1/5/2022). In review of yesterday's note (1/4/2022), there are no changes except as documented above.

## 2022-01-05 NOTE — PROGRESS NOTES
Hospitalist Richard Cover    -Called to evaluate the patient as Pt appeared to be in distress and unable to find a comfortable position; also with no urine output (bladder scan measuring 0 multiple times)  -ED course, H&P and cardiology consult notes reviewed  -Labs and imagings reviewed    Seen Pt at bedside, standing bedside and appeared unease  He stated he would be fine if he receives pepcid (took a few times per week at home)  GI cocktails provided with a good symptom relief    Exam: lungs grossly clear with minimal rales at bases, good air entry and flow   Abnormal rhythm but controlled rate in 50-60s  Speaks full sentences     Plan:   -Pepcid IV once  -STAT CXR  -Otherwise, continue to monitor, I/O and supportive care  -OK to continue care in Telemetry  -SABRINA and cardioversion planned for tomorrow     Time spent: The patient is critically ill,, with high chance of deterioration into acute respiratory failure, ACS, and eventually death if left untreated. The care that has been undertaken is medically complex. I spent 31 minutes CRITICAL CARE TIME, including managing medical issues, coordination of care, not including doing procedures, with no overlap in critical care time.       Addendum: verified with Cardiology Dr. Porter about diuretic dosing  -Pt received his standing BID lasix ordered at around 1700; one time lasix 80mg IVP was also ordered for 2200.   -With poor urine output, plan is to provide Diuril 500mg IV once 30 min before lasix 80mg IVP.

## 2022-01-05 NOTE — PROGRESS NOTES
Received bedside report from HEIKE Mialn, pt care assumed. Bed in lowest position. Pt educated on fall risk and use of call light.

## 2022-01-05 NOTE — PROGRESS NOTES
Patient was hypotensive.  BP was 108/72.  Due to hypotension, 80 mg IV lasix was held.  BMP was obtained which showed worsening kidney function.    On bedside patient is SOB, JVD, LE edema.  I am giving 120 mg IV lasix x 1.

## 2022-01-05 NOTE — PROGRESS NOTES
"1630 - Received report and assumed care of patient. Patient is alert and oriented x4. Patient has SOB while sitting able to breathe better while standing. 4 L NC. Son at bedside. Afib 100-130's on tele monitor. Patient is ambulatory self. Patient was updated on the plan of care for the day. Call light within reach, bed in low position, 2 side rails up. Educated on fall risk, verbalizes understanding. Will continue to monitor.    1915 - Patient increase SOB \"full feeling\" asking for antacids, not able to sit. Dr. Pearson at bedside to evaluate see MD note.   "

## 2022-01-05 NOTE — OR NURSING
Pt down to echo lab for SABRINA/cardioversion with anesthesia. Safety checks completed, consents signed and time out completed with everyone present.     SABRINA completed.    CV shock x1. Successful, pt currently SR.     Report called to HEIKE Graham. All questions answered.   Pt transported to PACU with RN and Anesthesia. Pt does not have any belongings with him.

## 2022-01-05 NOTE — PROGRESS NOTES
Received bedside report from HEIKE mcmahan, pt care assumed. VS stable, pt assessment complete. Pt A&Ox4, no pain at this time. POC discussed with pt and verbalizes no questions. Pt denies any additional needs at this time. Bed locked and in lowest position, bed alarm off. Pt educated on fall risk and verbalized understanding, call light within reach, hourly rounding initiated. In a atrial fib, awaiting possible cardioversion today.

## 2022-01-06 LAB
ANION GAP SERPL CALC-SCNC: 14 MMOL/L (ref 7–16)
BUN SERPL-MCNC: 52 MG/DL (ref 8–22)
CALCIUM SERPL-MCNC: 8.9 MG/DL (ref 8.5–10.5)
CHLORIDE SERPL-SCNC: 96 MMOL/L (ref 96–112)
CO2 SERPL-SCNC: 27 MMOL/L (ref 20–33)
CREAT SERPL-MCNC: 2.2 MG/DL (ref 0.5–1.4)
GLUCOSE SERPL-MCNC: 135 MG/DL (ref 65–99)
MAGNESIUM SERPL-MCNC: 2.5 MG/DL (ref 1.5–2.5)
POTASSIUM SERPL-SCNC: 4.3 MMOL/L (ref 3.6–5.5)
SODIUM SERPL-SCNC: 137 MMOL/L (ref 135–145)

## 2022-01-06 PROCEDURE — 700111 HCHG RX REV CODE 636 W/ 250 OVERRIDE (IP): Performed by: INTERNAL MEDICINE

## 2022-01-06 PROCEDURE — A9270 NON-COVERED ITEM OR SERVICE: HCPCS | Performed by: PHYSICIAN ASSISTANT

## 2022-01-06 PROCEDURE — A9270 NON-COVERED ITEM OR SERVICE: HCPCS | Performed by: STUDENT IN AN ORGANIZED HEALTH CARE EDUCATION/TRAINING PROGRAM

## 2022-01-06 PROCEDURE — 700105 HCHG RX REV CODE 258: Performed by: INTERNAL MEDICINE

## 2022-01-06 PROCEDURE — 700102 HCHG RX REV CODE 250 W/ 637 OVERRIDE(OP): Performed by: STUDENT IN AN ORGANIZED HEALTH CARE EDUCATION/TRAINING PROGRAM

## 2022-01-06 PROCEDURE — 83735 ASSAY OF MAGNESIUM: CPT

## 2022-01-06 PROCEDURE — 700102 HCHG RX REV CODE 250 W/ 637 OVERRIDE(OP): Performed by: INTERNAL MEDICINE

## 2022-01-06 PROCEDURE — 80048 BASIC METABOLIC PNL TOTAL CA: CPT

## 2022-01-06 PROCEDURE — 99233 SBSQ HOSP IP/OBS HIGH 50: CPT | Performed by: STUDENT IN AN ORGANIZED HEALTH CARE EDUCATION/TRAINING PROGRAM

## 2022-01-06 PROCEDURE — 99232 SBSQ HOSP IP/OBS MODERATE 35: CPT | Performed by: PHYSICIAN ASSISTANT

## 2022-01-06 PROCEDURE — A9270 NON-COVERED ITEM OR SERVICE: HCPCS | Performed by: INTERNAL MEDICINE

## 2022-01-06 PROCEDURE — 770020 HCHG ROOM/CARE - TELE (206)

## 2022-01-06 PROCEDURE — 36415 COLL VENOUS BLD VENIPUNCTURE: CPT

## 2022-01-06 PROCEDURE — 700102 HCHG RX REV CODE 250 W/ 637 OVERRIDE(OP): Performed by: PHYSICIAN ASSISTANT

## 2022-01-06 RX ORDER — METOPROLOL SUCCINATE 50 MG/1
100 TABLET, EXTENDED RELEASE ORAL
Status: DISCONTINUED | OUTPATIENT
Start: 2022-01-06 | End: 2022-01-06

## 2022-01-06 RX ORDER — METOPROLOL SUCCINATE 50 MG/1
50 TABLET, EXTENDED RELEASE ORAL
Status: DISCONTINUED | OUTPATIENT
Start: 2022-01-06 | End: 2022-01-06

## 2022-01-06 RX ORDER — METOPROLOL SUCCINATE 50 MG/1
100 TABLET, EXTENDED RELEASE ORAL
Status: DISCONTINUED | OUTPATIENT
Start: 2022-01-06 | End: 2022-01-07

## 2022-01-06 RX ORDER — SPIRONOLACTONE 25 MG/1
12.5 TABLET ORAL
Status: DISCONTINUED | OUTPATIENT
Start: 2022-01-07 | End: 2022-01-08 | Stop reason: HOSPADM

## 2022-01-06 RX ORDER — LOSARTAN POTASSIUM 25 MG/1
12.5 TABLET ORAL
Status: DISCONTINUED | OUTPATIENT
Start: 2022-01-06 | End: 2022-01-08 | Stop reason: HOSPADM

## 2022-01-06 RX ADMIN — FUROSEMIDE 80 MG: 10 INJECTION, SOLUTION INTRAMUSCULAR; INTRAVENOUS at 05:52

## 2022-01-06 RX ADMIN — METOPROLOL SUCCINATE 100 MG: 50 TABLET, EXTENDED RELEASE ORAL at 17:14

## 2022-01-06 RX ADMIN — ASPIRIN 81 MG: 81 TABLET, COATED ORAL at 05:53

## 2022-01-06 RX ADMIN — FUROSEMIDE 80 MG: 10 INJECTION, SOLUTION INTRAMUSCULAR; INTRAVENOUS at 17:14

## 2022-01-06 RX ADMIN — APIXABAN 5 MG: 5 TABLET, FILM COATED ORAL at 05:53

## 2022-01-06 RX ADMIN — ROSUVASTATIN CALCIUM 10 MG: 20 TABLET, FILM COATED ORAL at 05:52

## 2022-01-06 RX ADMIN — METOPROLOL TARTRATE 50 MG: 50 TABLET, FILM COATED ORAL at 05:53

## 2022-01-06 RX ADMIN — LOSARTAN POTASSIUM 12.5 MG: 25 TABLET, FILM COATED ORAL at 11:50

## 2022-01-06 RX ADMIN — GUAIFENESIN 600 MG: 600 TABLET, EXTENDED RELEASE ORAL at 17:14

## 2022-01-06 RX ADMIN — APIXABAN 5 MG: 5 TABLET, FILM COATED ORAL at 17:16

## 2022-01-06 RX ADMIN — CHLOROTHIAZIDE SODIUM 500 MG: 500 INJECTION, POWDER, LYOPHILIZED, FOR SOLUTION INTRAVENOUS at 17:15

## 2022-01-06 RX ADMIN — GUAIFENESIN 600 MG: 600 TABLET, EXTENDED RELEASE ORAL at 05:53

## 2022-01-06 RX ADMIN — POTASSIUM CHLORIDE 40 MEQ: 1500 TABLET, EXTENDED RELEASE ORAL at 05:53

## 2022-01-06 ASSESSMENT — COGNITIVE AND FUNCTIONAL STATUS - GENERAL
MOBILITY SCORE: 24
SUGGESTED CMS G CODE MODIFIER DAILY ACTIVITY: CH
DAILY ACTIVITIY SCORE: 24
SUGGESTED CMS G CODE MODIFIER MOBILITY: CH

## 2022-01-06 ASSESSMENT — ENCOUNTER SYMPTOMS
DIARRHEA: 0
WEIGHT LOSS: 1
SHORTNESS OF BREATH: 1
ORTHOPNEA: 1
SPUTUM PRODUCTION: 1
PALPITATIONS: 0
HEARTBURN: 1
COUGH: 1
FEVER: 0
PND: 1
CHILLS: 0
DIZZINESS: 0

## 2022-01-06 ASSESSMENT — FIBROSIS 4 INDEX: FIB4 SCORE: 1.33

## 2022-01-06 ASSESSMENT — PAIN DESCRIPTION - PAIN TYPE: TYPE: ACUTE PAIN

## 2022-01-06 NOTE — PROGRESS NOTES
Hospital Medicine Daily Progress Note    Date of Service  1/6/2022    Chief Complaint  Miah Oneill is a 66 y.o. male admitted 1/3/2022 with sob    Hospital Course  66 y.o. male history of hypertension and hyperlipidemia who presented 1/3/2022 with dyspnea, coughing, BLE swelling and weight gain 20 lbs in the past week.   He is noted to have new onset A. fib with RVR and CHAYO.  Mr. Oneill states that he has been feeling chest congestion since August.  Patient is pretty adamant that he has a bronchitis/pneumonia since August, which is progressively worsening.  He has associated orthopnea, paroxysmal nocturnal dyspnea, nocturnal hypoxia noted on home overnight pulse ox evaluation.   Cardiology was consulted and patient was started on oral metoprolol with plan for cardioversion. He was also started on aggressive diuresis due to volume overload. echocardiogram was done and showed severely reduced EF 20% possibly due to tachycardic induced cardiomyopathy.     Interval Problem Update  Patient seen and examined, continues to feel improved, still with SOB on exertion but improved. Edema improved however still significant. Down to 1L NC   Continue IV lasix 80 mg BID   Cr improving   Has remained in SR since cardioversion   Cardiology following, appreciate their help with management  Home O2 evaluation ordered     If pt continues to improve plan to transition to oral lasix and DC home tomorrow, may need O2 pending evaluation.     All Data, Medication data reviewed.  I have personally seen and examined the patient at bedside. I discussed the plan of care with patient and bedside RN.    Consultants/Specialty  cardiology    Code Status  Full Code    Disposition  Patient is not medically cleared for discharge.   Anticipate discharge to to home with close outpatient follow-up.  I have placed the appropriate orders for post-discharge needs.    Review of Systems  Review of Systems   Constitutional: Positive for  malaise/fatigue.   Respiratory: Positive for cough, sputum production and shortness of breath.    Cardiovascular: Positive for leg swelling. Negative for chest pain.   All other systems reviewed and are negative.       Physical Exam  Temp:  [35.9 °C (96.7 °F)-36.8 °C (98.2 °F)] 36.8 °C (98.2 °F)  Pulse:  [60-93] 60  Resp:  [11-18] 18  BP: ()/(52-77) 101/72  SpO2:  [94 %-100 %] 94 %    Physical Exam  Vitals and nursing note reviewed.   Constitutional:       General: He is not in acute distress.     Appearance: Normal appearance. He is ill-appearing. He is not toxic-appearing.   HENT:      Head: Normocephalic and atraumatic.      Mouth/Throat:      Pharynx: Oropharynx is clear.   Eyes:      Extraocular Movements: Extraocular movements intact.      Conjunctiva/sclera: Conjunctivae normal.      Pupils: Pupils are equal, round, and reactive to light.   Neck:      Vascular: No carotid bruit.   Cardiovascular:      Rate and Rhythm: Tachycardia present. Rhythm irregular.      Heart sounds: No murmur heard.      Pulmonary:      Effort: Pulmonary effort is normal. No respiratory distress.      Breath sounds: No wheezing or rales.      Comments: On 1L NC, bibasilar crackles  Abdominal:      General: Abdomen is flat. Bowel sounds are normal. There is no distension.      Palpations: Abdomen is soft. There is no mass.      Tenderness: There is no abdominal tenderness.   Musculoskeletal:         General: Normal range of motion.      Cervical back: Neck supple.      Right lower leg: Edema present.      Left lower leg: Edema present.      Comments: 3+ pitting edema up to knees   Skin:     General: Skin is warm and dry.   Neurological:      General: No focal deficit present.      Mental Status: He is alert and oriented to person, place, and time.   Psychiatric:         Mood and Affect: Mood normal.         Behavior: Behavior normal.         Fluids    Intake/Output Summary (Last 24 hours) at 1/6/2022 1450  Last data filed at  1/5/2022 1606  Gross per 24 hour   Intake 250 ml   Output 400 ml   Net -150 ml       Laboratory  Recent Labs     01/03/22  1600 01/04/22  0320 01/05/22  0302   WBC 10.5 12.1* 13.4*   RBC 5.05 4.77 4.97   HEMOGLOBIN 15.2 14.2 14.7   HEMATOCRIT 46.3 44.0 45.4   MCV 91.7 92.2 91.3   MCH 30.1 29.8 29.6   MCHC 32.8* 32.3* 32.4*   RDW 48.8 49.3 47.9   PLATELETCT 298 277 286   MPV 11.3 11.1 11.8     Recent Labs     01/05/22  0302 01/05/22  1717 01/06/22  0754   SODIUM 135 140 137   POTASSIUM 4.2 4.1 4.3   CHLORIDE 96 98 96   CO2 21 28 27   GLUCOSE 94 84 135*   BUN 48* 48* 52*   CREATININE 2.35* 2.39* 2.20*   CALCIUM 9.2 9.2 8.9                   Imaging  EC-SABRINA W/O CONT         US-RENAL   Final Result      1.  No hydronephrosis.   2.  Bilateral pleural effusions.   3.  Trace pelvic free fluid.      DX-CHEST-LIMITED (1 VIEW)   Final Result      Increasing pulmonary airspace process consistent with worsening edema      EC-ECHOCARDIOGRAM COMPLETE W/O CONT   Final Result      DX-CHEST-PORTABLE (1 VIEW)   Final Result      1.  Pulmonary vascular congestion and minimal bilateral pleural effusions.  No overt pulmonary edema.   2.  Hazy RIGHT lung base opacity, atelectasis versus pneumonia.   3.  Mild cardiomegaly.      CL-CARDIOVERSION    (Results Pending)        Assessment/Plan  * Atrial fibrillation with rapid ventricular response (HCC)- (present on admission)  Assessment & Plan  In setting of volume overload, unclear if undiagnosed CHF led to volume overload and RVR or if uncontrolled tachyarrhythmia causing congestive heart failure.  TSH normal   Diltiazem changed to metoprolol due to Reduced EF   Monitor on telemetry  [id number] 3, started on eliquis   Successful Cardioversion 1/5  Echo with EF 20%    Elevated troponin- (present on admission)  Assessment & Plan  Initial troponin 51, proBNP 7400.  No chest pain  EKG shows A. fib with RVR, T wave abnormality leads V4 through 6, no ST segment elevation or  depressions.  Continue aspirin, trend troponin.  TTE.  Repeat EKG with any chest pain    Acute systolic heart failure (HCC)- (present on admission)  Assessment & Plan  Patient presents with acute respiratory failure and volume overload In setting of A. fib with RVR  Possible tachycardic induced cardiomyopathy   Echo showing EF 20%, pt with severe edema, JVD and bilateral pleural effusions with ARF consistent with severe volume overload   Continue aggressive diuresis   Started on metoprolol 50 mg BID, lasix 80 mg BID, losartan and spironolactone ordered   Monitor electrolytes  Cardiology following   Monitor Is/Os, fluid restriction, low salt diet, daily weights    Acute respiratory failure with hypoxia (HCC)- (present on admission)  Assessment & Plan  Secondary to volume overload/acute heart failure   Continue IV lasix   Oxygen per guidelines wean as able  Continuous pulse ox  Incentive spirometer  DuoNebs as needed  Procalcitonin within normal limits  Wean O2 as able     CHAYO (acute kidney injury) (HCC)- (present on admission)  Assessment & Plan  Suspecting Prerenal from vascular congestion from volume overload  U/a negative, CPK within goal  US negative for signs of obstruction, post void normal   Continue diuresis,   Renal dose meds and avoid nephrotoxins  Monitor I&O's  Follow renal function closely   Improving     Primary hypertension- (present on admission)  Assessment & Plan  Hold home Cozaar due to renal function   Metoprolol for  for A. fib with RVR.  Diuresis Lasix 80 mg BID   Losartan and spironolactone ordered   IVF antihypertensives with parameters    Dyslipidemia- (present on admission)  Assessment & Plan  Continue Crestor       VTE prophylaxis: therapeutic anticoagulation with eliquis    I have performed a physical exam and reviewed and updated ROS and Plan today (1/6/2022). In review of yesterday's note (1/5/2022), there are no changes except as documented above.

## 2022-01-06 NOTE — PROCEDURES
Procedure performed: External DC Cardioversion    : Magnus Porter MD    Assistant: None    Anesthesia: Per anesthesia services    Indication: Atrial fibrillation    Preprocedural Diagnosis:   Atrial Fibrillation  Postprocedural Diagnosis: Sinus Rhythm    Description of procedure:    Mr. Oneill was brought to the pre/post procedure area of the cath lab. Informed consent was obtained. Defibrillator pads were placed in the anterior and posterior position.  Adequate sedation was obtained with assistance of anesthesia. A SABRINA performed confirmed that there was NO left atrial thrombus. Patient was successfully cardioverted with 120 J synchronized biphasic energy into sinus rhythm. He was monitored in the recovery area until criteria met and will be discharged home.    Conclusion: Successful DC cardioversion    EBL: None    Complications: None apparent      Electronically signed: Magnus Porter MD  Cardiologist [facility]

## 2022-01-06 NOTE — HEART FAILURE PROGRAM
Intent to Discharge Order Check Up - HFrEF    Discovery of ejection fraction of 20% in the setting of AF c RVR.     Residence: [place], [place]  Insurance: Medicare [employer] [facility] Eligible?: no  Indication on facesheet for Hydralazine Hydrochloride/Isosorbide Dinitrate? no  HFrEF greater than 3 months?: no If so, Device Screen done?: n/a    DM dx? no  Atrial arrhythmia dx? yes  Current smoker? Quit 22 y/a per h/p  HF Education Documented? Added by me    Dr. Otto and Bedside RN, Mechelle Davison messaged regarding all missing items below.    GDMT:    Where we stand right now with HFrEF MEASURES per review of most recent notes and discharge med rec:    • Evidence Based Beta Blocker (bisoprolol, carvedilol, or toprol xl), for EF of 40% or less: per LUKE note today, will switch from lopressor to toprol xl  • GENNARO - I, for EF of 40% or less ARNI is preferred If not cost prohibitive for patient: losartan  • SGLT2 inhibitor with proven ASCVD, HF, or DKD benefit (canagliflozin, dapagliflozin, or empagliflozin) If not cost prohibitive for patient  • Aldosterone antagonist, for EF of 35% or less: n/a  • Anticoagulation for atrial arrhythmia: apixaban  • Glycemic control for DM + HF: n/a  • Lipid lowering medication for DM + HF: n/a  • Hydralazine Hydrochloride/Isosorbide Dinitrate: n/a  • Pneumococcal vaccine: missing  • Influenza vaccine for current season: missing  • Smoking cessation counseling documented: n/a  • Device screening: n/a  • Referral to disease management program specializing in heart failure care      Thank you, Monse, Cardio RN Navigator [phone]

## 2022-01-06 NOTE — PROGRESS NOTES
Assumed care of PT A&O 4. Pt resting in bed with no signs of labored breathing. On 3L O2. Tele monitor in place, cardiac rhythm being monitored. Call light within reach, bed in lowest position, upper bed rails up. Pt was updated on plan of care for the day.

## 2022-01-06 NOTE — PROGRESS NOTES
Monitor summary:    SR 74-84  (R-O) Bigem  12 bts SVT  1st degree heart block  .21/.10/.45

## 2022-01-06 NOTE — PROGRESS NOTES
"         [facility] Cardiology Progress Note    Name:   Miah Oneill   YOB: 1955  Age:   66 y.o.  male   MRN:   [id number]    Consulting Cardiologist: Dr Porter     Chief Complaint: progressive dyspnea, leg swelling      History of Present Illness:  66-year-old male who lives in [place] presented to the hospital 1/3/2022 with 2-3 months of dyspnea, productive cough, 2 to 3 weeks of leg swelling and weight gain.  Found to be in atrial fibrillation with rapid ventricular response, transthoracic echocardiogram showed left and right ventricular heart failure, EF 20%, grade 2 diastolic dysfunction. He has been diuresed and scheduled for a SABRINA/CV today, 1/5.     Medical history includes a retinal occlusion several years ago.  Following this he saw a cardiologist and wore a heart monitor and had a stress test, both were reportedly normal.  He has never been told he has had a heart attack in the past.  He did not have any exertional angina before his hospital stay.    Interim Events:   Miah is up walking in his room this morning.  Reports improvement in orthopnea and leg swelling although both are still present.  Getting short of breath with \"organizing his room\".  Reports normal urination pattern, no increase in frequency.    Renal function is stable, no I/Os recorded but weights continue to decrease.   Remains in sinus rhythm this morning    Review of Systems   Constitutional: Positive for weight loss. Negative for chills, fever and malaise/fatigue.   Respiratory: Positive for cough and shortness of breath.    Cardiovascular: Positive for orthopnea, leg swelling and PND. Negative for chest pain and palpitations.   Gastrointestinal: Positive for heartburn. Negative for diarrhea.   Genitourinary: Negative for frequency.   Neurological: Negative for dizziness.         History reviewed. No pertinent surgical history.    History reviewed. No pertinent family history.    Social History "     Tobacco Use   Smoking Status Former Smoker   • Quit date:    • Years since quittin.0   Smokeless Tobacco Never Used       No Known Allergies      Medications   No current facility-administered medications on file prior to encounter.     Current Outpatient Medications on File Prior to Encounter   Medication Sig Dispense Refill   • rosuvastatin (CRESTOR) 10 MG Tab Take 10 mg by mouth every day.     • losartan (COZAAR) 50 MG Tab Take 50 mg by mouth every day.     • furosemide (LASIX) 40 MG Tab Take 40 mg by mouth every day.     • albuterol 108 (90 Base) MCG/ACT Aero Soln inhalation aerosol Inhale 2 Puffs every 6 hours as needed for Shortness of Breath.             Physical Exam  Vitals:    22 0553   BP:    Pulse: 93   Resp:    Temp:    SpO2:      Physical Exam  Vitals reviewed.   HENT:      Head: Normocephalic and atraumatic.   Neck:      Comments: +JVD  Cardiovascular:      Rate and Rhythm: Normal rate and regular rhythm.      Heart sounds: No murmur heard.      Pulmonary:      Effort: No respiratory distress.      Comments: Diminished breath sounds in right lower lobe, basilar Rales in left  Abdominal:      General: Abdomen is flat. There is no distension.   Musculoskeletal:      Comments: 2-3+ pitting edema bilateral pretibia   Skin:     General: Skin is warm.   Neurological:      Mental Status: He is alert. Mental status is at baseline.   Psychiatric:         Judgment: Judgment normal.           Labs (personally reviewed):     Lab Results   Component Value Date/Time    SODIUM 140 2022 05:17 PM    POTASSIUM 4.1 2022 05:17 PM    CHLORIDE 98 2022 05:17 PM    CO2 28 2022 05:17 PM    GLUCOSE 84 2022 05:17 PM    BUN 48 (H) 2022 05:17 PM    CREATININE 2.39 (H) 2022 05:17 PM     No results found for: CHOLSTRLTOT, LDL, HDL, TRIGLYCERIDE  No results found for: BNPBTYPENAT      Cardiac Imaging and Procedures Review:      Personal Telemetry Review:   SR 74-84  (R-O)  Bigem  12 bts SVT  1st degree heart block    Echo 1/4/21:    No prior study is available for comparison.   The left ventricular ejection fraction is visually estimated to be 20%.  Grade II diastolic dysfunction.  The right ventricle is dilated.  Reduced right ventricular systolic function.  Normal inferior vena cava size and inspiratory collapse.  Moderate mitral regurgitation.  Moderate tricuspid regurgitation.  Estimated right ventricular systolic pressure is 48  mmHg.       Assessment and Medical Decision Making:  #  Atrial fibrillation with RVR  - S/P successful SABRINA guided DCCV 1/5/2022.  Remains in sinus rhythm overnight.  -On metoprolol tartrate, will convert this to long-acting succinate today  - [id number] at least 2, apixaban 5 mg twice daily  - stop aspirin    # Acute systolic heart failure, suspect secondary to tachycardia induced cardiomyopathy  - Rhythm control as above  -No preceding anginal symptoms to suggest occlusive coronary artery disease.  Heart rates too fast for CTA.  Plan for nuclear medicine stress test tomorrow  - start losartan 12.5 today  - schedule spironolactone 12.5mg for tomorrow     # Renal dysfunction, unclear baseline  # High Anion gap metabolic acidosis, resolved  - Recommend continuing high-dose IV diuretics as he is warm and wet on exam, pulmonary edema on x-ray  - please obtain standing weight this morning.     # Retinal Occlusion, remote  - cont statin      MPI  Titrate GDMT    Please see Dr Porter's attestation for additions and further recommendations.    Clarisse Morrow PA-C  [facility]

## 2022-01-06 NOTE — CARE PLAN
The patient is Stable - Low risk of patient condition declining or worsening    Shift Goals  Clinical Goals: Monitor urine output  Patient Goals: Rest    Progress made toward(s) clinical / shift goals:        Problem: Knowledge Deficit - Standard  Goal: Patient and family/care givers will demonstrate understanding of plan of care, disease process/condition, diagnostic tests and medications  Outcome: Progressing    Patient is not progressing towards the following goals: n/a

## 2022-01-06 NOTE — PROGRESS NOTES
Assumed care of patient at 1915, received bedside report from day shift RN. Bed is locked and in lowest position with call light within reach. Treaded socks in place. Patient updated on plan of care. White board updated. Pt A&Ox4. Patient's breathing pattern is unlabored. Tele monitor in place and cardiac rhythm being monitored.

## 2022-01-06 NOTE — ANESTHESIA POSTPROCEDURE EVALUATION
Patient: Miah Oneill    Procedure Summary     Date: 01/05/22 Room / Location: [facility] - ECHOCARDIOLOGY [facility]    Anesthesia Start: 1518 Anesthesia Stop: 1607    Procedures:       EC-SABRINA W/O CONT      CL-CARDIOVERSION Diagnosis:       Atrial fibrillation with rapid ventricular response (HCC)      Atrial fibrillation with rapid ventricular response (HCC)      Atrial fibrillation with rapid ventricular response (HCC)      (See Assoicated Dx)    Scheduled Providers: Magnus Porter M.D.; Mansoor White M.D. Responsible Provider: Mansoor White M.D.    Anesthesia Type: MAC ASA Status: 4          Final Anesthesia Type: MAC  Last vitals  BP   Blood Pressure : 109/68    Temp   36.6 °C (97.8 °F)    Pulse   (!) 127   Resp   18    SpO2   94 %      Anesthesia Post Evaluation    Patient location during evaluation: PACU  Patient participation: complete - patient participated  Level of consciousness: awake and alert  Pain score: 0    Airway patency: patent  Anesthetic complications: no  Cardiovascular status: hemodynamically stable  Respiratory status: acceptable  Hydration status: euvolemic    PONV: none          No complications documented.     Nurse Pain Score: 0 (NPRS)

## 2022-01-06 NOTE — OR NURSING
Patient AAOx4, calm, denies pain or nausea. VSS, afebrile, NSR 72. 12 lead EKG complete. Resting comfortably in gurney.

## 2022-01-06 NOTE — ANESTHESIA TIME REPORT
Anesthesia Start and Stop Event Times     Date Time Event    1/5/2022 1518 Anesthesia Start     1521 Ready for Procedure     1607 Anesthesia Stop        Responsible Staff  01/05/22    Name Role Begin End    Mansoor White M.D. Anesth 1518 1607        Preop Diagnosis (Free Text):  Pre-op Diagnosis             Preop Diagnosis (Codes):    Premium Reason  A. 3PM - 7AM    Comments:

## 2022-01-07 ENCOUNTER — APPOINTMENT (OUTPATIENT)
Dept: RADIOLOGY | Facility: MEDICAL CENTER | Age: 67
DRG: 286 | End: 2022-01-07
Attending: PHYSICIAN ASSISTANT
Payer: MEDICARE

## 2022-01-07 PROBLEM — I25.10 STENOSIS OF LEFT ANTERIOR DESCENDING (LAD) ARTERY: Chronic | Status: ACTIVE | Noted: 2022-01-07

## 2022-01-07 PROBLEM — E87.6 HYPOKALEMIA: Status: ACTIVE | Noted: 2022-01-07

## 2022-01-07 LAB
ANION GAP SERPL CALC-SCNC: 14 MMOL/L (ref 7–16)
BUN SERPL-MCNC: 48 MG/DL (ref 8–22)
CALCIUM SERPL-MCNC: 8.9 MG/DL (ref 8.5–10.5)
CHLORIDE SERPL-SCNC: 97 MMOL/L (ref 96–112)
CO2 SERPL-SCNC: 27 MMOL/L (ref 20–33)
CREAT SERPL-MCNC: 1.82 MG/DL (ref 0.5–1.4)
GLUCOSE SERPL-MCNC: 98 MG/DL (ref 65–99)
MAGNESIUM SERPL-MCNC: 2.3 MG/DL (ref 1.5–2.5)
NT-PROBNP SERPL IA-MCNC: 8687 PG/ML (ref 0–125)
POTASSIUM SERPL-SCNC: 3.2 MMOL/L (ref 3.6–5.5)
SODIUM SERPL-SCNC: 138 MMOL/L (ref 135–145)

## 2022-01-07 PROCEDURE — 99233 SBSQ HOSP IP/OBS HIGH 50: CPT | Performed by: STUDENT IN AN ORGANIZED HEALTH CARE EDUCATION/TRAINING PROGRAM

## 2022-01-07 PROCEDURE — 90732 PPSV23 VACC 2 YRS+ SUBQ/IM: CPT | Performed by: STUDENT IN AN ORGANIZED HEALTH CARE EDUCATION/TRAINING PROGRAM

## 2022-01-07 PROCEDURE — 700102 HCHG RX REV CODE 250 W/ 637 OVERRIDE(OP): Performed by: STUDENT IN AN ORGANIZED HEALTH CARE EDUCATION/TRAINING PROGRAM

## 2022-01-07 PROCEDURE — 700111 HCHG RX REV CODE 636 W/ 250 OVERRIDE (IP): Performed by: INTERNAL MEDICINE

## 2022-01-07 PROCEDURE — 700102 HCHG RX REV CODE 250 W/ 637 OVERRIDE(OP): Performed by: NURSE PRACTITIONER

## 2022-01-07 PROCEDURE — A9270 NON-COVERED ITEM OR SERVICE: HCPCS | Performed by: STUDENT IN AN ORGANIZED HEALTH CARE EDUCATION/TRAINING PROGRAM

## 2022-01-07 PROCEDURE — 83880 ASSAY OF NATRIURETIC PEPTIDE: CPT

## 2022-01-07 PROCEDURE — 36415 COLL VENOUS BLD VENIPUNCTURE: CPT

## 2022-01-07 PROCEDURE — 75574 CT ANGIO HRT W/3D IMAGE: CPT | Mod: ME

## 2022-01-07 PROCEDURE — 700102 HCHG RX REV CODE 250 W/ 637 OVERRIDE(OP): Performed by: INTERNAL MEDICINE

## 2022-01-07 PROCEDURE — 80048 BASIC METABOLIC PNL TOTAL CA: CPT

## 2022-01-07 PROCEDURE — A9270 NON-COVERED ITEM OR SERVICE: HCPCS | Performed by: PHYSICIAN ASSISTANT

## 2022-01-07 PROCEDURE — 99232 SBSQ HOSP IP/OBS MODERATE 35: CPT | Mod: FS | Performed by: NURSE PRACTITIONER

## 2022-01-07 PROCEDURE — 90662 IIV NO PRSV INCREASED AG IM: CPT | Performed by: STUDENT IN AN ORGANIZED HEALTH CARE EDUCATION/TRAINING PROGRAM

## 2022-01-07 PROCEDURE — A9270 NON-COVERED ITEM OR SERVICE: HCPCS | Performed by: INTERNAL MEDICINE

## 2022-01-07 PROCEDURE — 90471 IMMUNIZATION ADMIN: CPT

## 2022-01-07 PROCEDURE — A9270 NON-COVERED ITEM OR SERVICE: HCPCS | Performed by: NURSE PRACTITIONER

## 2022-01-07 PROCEDURE — 700102 HCHG RX REV CODE 250 W/ 637 OVERRIDE(OP): Performed by: PHYSICIAN ASSISTANT

## 2022-01-07 PROCEDURE — 83735 ASSAY OF MAGNESIUM: CPT

## 2022-01-07 PROCEDURE — 700117 HCHG RX CONTRAST REV CODE 255: Performed by: PHYSICIAN ASSISTANT

## 2022-01-07 PROCEDURE — 700111 HCHG RX REV CODE 636 W/ 250 OVERRIDE (IP): Performed by: STUDENT IN AN ORGANIZED HEALTH CARE EDUCATION/TRAINING PROGRAM

## 2022-01-07 PROCEDURE — 770020 HCHG ROOM/CARE - TELE (206)

## 2022-01-07 RX ORDER — TORSEMIDE 20 MG/1
40 TABLET ORAL
Status: DISCONTINUED | OUTPATIENT
Start: 2022-01-07 | End: 2022-01-08 | Stop reason: HOSPADM

## 2022-01-07 RX ORDER — POTASSIUM CHLORIDE 20 MEQ/1
40 TABLET, EXTENDED RELEASE ORAL EVERY 6 HOURS
Status: COMPLETED | OUTPATIENT
Start: 2022-01-07 | End: 2022-01-07

## 2022-01-07 RX ORDER — SODIUM CHLORIDE 9 MG/ML
INJECTION, SOLUTION INTRAVENOUS CONTINUOUS
Status: DISCONTINUED | OUTPATIENT
Start: 2022-01-08 | End: 2022-01-08

## 2022-01-07 RX ORDER — METOPROLOL SUCCINATE 50 MG/1
50 TABLET, EXTENDED RELEASE ORAL
Status: DISCONTINUED | OUTPATIENT
Start: 2022-01-08 | End: 2022-01-08 | Stop reason: HOSPADM

## 2022-01-07 RX ORDER — POTASSIUM CHLORIDE 20 MEQ/1
40 TABLET, EXTENDED RELEASE ORAL 2 TIMES DAILY
Status: DISCONTINUED | OUTPATIENT
Start: 2022-01-07 | End: 2022-01-07

## 2022-01-07 RX ORDER — NITROGLYCERIN 0.4 MG/1
0.4 TABLET SUBLINGUAL
Status: DISCONTINUED | OUTPATIENT
Start: 2022-01-07 | End: 2022-01-08 | Stop reason: HOSPADM

## 2022-01-07 RX ADMIN — ROSUVASTATIN CALCIUM 10 MG: 20 TABLET, FILM COATED ORAL at 05:18

## 2022-01-07 RX ADMIN — FUROSEMIDE 80 MG: 10 INJECTION, SOLUTION INTRAMUSCULAR; INTRAVENOUS at 05:16

## 2022-01-07 RX ADMIN — POTASSIUM CHLORIDE 40 MEQ: 1500 TABLET, EXTENDED RELEASE ORAL at 05:16

## 2022-01-07 RX ADMIN — INFLUENZA A VIRUS A/VICTORIA/2570/2019 IVR-215 (H1N1) ANTIGEN (FORMALDEHYDE INACTIVATED), INFLUENZA A VIRUS A/TASMANIA/503/2020 IVR-221 (H3N2) ANTIGEN (FORMALDEHYDE INACTIVATED), INFLUENZA B VIRUS B/PHUKET/3073/2013 ANTIGEN (FORMALDEHYDE INACTIVATED), AND INFLUENZA B VIRUS B/WASHINGTON/02/2019 ANTIGEN (FORMALDEHYDE INACTIVATED) 0.7 ML: 60; 60; 60; 60 INJECTION, SUSPENSION INTRAMUSCULAR at 06:23

## 2022-01-07 RX ADMIN — NITROGLYCERIN 0.4 MG: 0.4 TABLET, ORALLY DISINTEGRATING SUBLINGUAL at 11:40

## 2022-01-07 RX ADMIN — GUAIFENESIN 600 MG: 600 TABLET, EXTENDED RELEASE ORAL at 17:22

## 2022-01-07 RX ADMIN — FAMOTIDINE 20 MG: 20 TABLET ORAL at 05:18

## 2022-01-07 RX ADMIN — LOSARTAN POTASSIUM 12.5 MG: 25 TABLET, FILM COATED ORAL at 08:25

## 2022-01-07 RX ADMIN — METOPROLOL SUCCINATE 100 MG: 50 TABLET, EXTENDED RELEASE ORAL at 08:26

## 2022-01-07 RX ADMIN — POTASSIUM CHLORIDE 40 MEQ: 1500 TABLET, EXTENDED RELEASE ORAL at 23:53

## 2022-01-07 RX ADMIN — APIXABAN 5 MG: 5 TABLET, FILM COATED ORAL at 05:18

## 2022-01-07 RX ADMIN — SPIRONOLACTONE 12.5 MG: 25 TABLET ORAL at 08:26

## 2022-01-07 RX ADMIN — POTASSIUM CHLORIDE 40 MEQ: 1500 TABLET, EXTENDED RELEASE ORAL at 13:28

## 2022-01-07 RX ADMIN — POTASSIUM CHLORIDE 40 MEQ: 1500 TABLET, EXTENDED RELEASE ORAL at 08:25

## 2022-01-07 RX ADMIN — POTASSIUM CHLORIDE 40 MEQ: 1500 TABLET, EXTENDED RELEASE ORAL at 17:22

## 2022-01-07 RX ADMIN — PNEUMOCOCCAL VACCINE POLYVALENT 25 MCG
25; 25; 25; 25; 25; 25; 25; 25; 25; 25; 25; 25; 25; 25; 25; 25; 25; 25; 25; 25; 25; 25; 25 INJECTION, SOLUTION INTRAMUSCULAR; SUBCUTANEOUS at 06:21

## 2022-01-07 RX ADMIN — TORSEMIDE 40 MG: 20 TABLET ORAL at 17:23

## 2022-01-07 RX ADMIN — IOHEXOL 65 ML: 350 INJECTION, SOLUTION INTRAVENOUS at 12:52

## 2022-01-07 ASSESSMENT — ENCOUNTER SYMPTOMS
BLURRED VISION: 0
TINGLING: 0
BLOOD IN STOOL: 0
MYALGIAS: 0
PALPITATIONS: 0
COUGH: 0
CLAUDICATION: 0
FALLS: 0
VOMITING: 0
HEADACHES: 0
LOSS OF CONSCIOUSNESS: 0
WEIGHT LOSS: 0
DIARRHEA: 0
FEVER: 0
SHORTNESS OF BREATH: 0
PND: 0
ABDOMINAL PAIN: 0
ORTHOPNEA: 0
CHILLS: 0
DIZZINESS: 0
CONSTIPATION: 0
BRUISES/BLEEDS EASILY: 0
NAUSEA: 0

## 2022-01-07 ASSESSMENT — PAIN DESCRIPTION - PAIN TYPE: TYPE: ACUTE PAIN

## 2022-01-07 ASSESSMENT — FIBROSIS 4 INDEX: FIB4 SCORE: 1.33

## 2022-01-07 NOTE — PROGRESS NOTES
Hospital Medicine Daily Progress Note    Date of Service  1/7/2022    Chief Complaint  Miah Oneill is a 66 y.o. male admitted 1/3/2022 with sob    Hospital Course  66 y.o. male history of hypertension and hyperlipidemia who presented 1/3/2022 with dyspnea, coughing, BLE swelling and weight gain 20 lbs in the past week.   He is noted to have new onset A. fib with RVR and CHAYO.  Mr. Oneill states that he has been feeling chest congestion since August.  Patient is pretty adamant that he has a bronchitis/pneumonia since August, which is progressively worsening.  He has associated orthopnea, paroxysmal nocturnal dyspnea, nocturnal hypoxia noted on home overnight pulse ox evaluation.   Cardiology was consulted and patient was started on oral metoprolol with plan for cardioversion. He was also started on aggressive diuresis due to volume overload. echocardiogram was done and showed severely reduced EF 20% possibly due to tachycardic induced cardiomyopathy.     Interval Problem Update  No significant events overnight.  Shortness of breath improved.  Lower extremity edema improving.  Has been on furosemide 80 mg IV twice daily transition to torsemide 40 mg amount twice daily per cardiology.  Patient is currently on room air now.  CTA coronary artery showing 70% stenosis of the LAD.  Cardiology requesting continued inpatient stay for left heart catheterization for further analysis and possible intervention.  Blood pressures soft in the 90s/60s but denies any lightheadedness or dizziness.  Increasing white count to 13.  Procalcitonin negative.    Patient will continue inpatient stay for left heart catheterization.  Can discharge following catheterization with possible stenting possibly tomorrow.    All Data, Medication data reviewed.  I have personally seen and examined the patient at bedside. I discussed the plan of care with patient, family, bedside RN, charge RN,  and  pharmacy.    Consultants/Specialty  cardiology     Code Status  Full Code    Disposition  Patient is not medically cleared for discharge.   Anticipate discharge to to home with close outpatient follow-up.  I have placed the appropriate orders for post-discharge needs.    Review of Systems  Review of Systems   Constitutional: Negative for chills, fever and malaise/fatigue.   Respiratory: Negative for cough and shortness of breath.    Cardiovascular: Positive for leg swelling (improving). Negative for chest pain and palpitations.   Gastrointestinal: Negative for constipation, diarrhea, nausea and vomiting.   Genitourinary: Positive for frequency. Negative for dysuria.   Musculoskeletal: Negative for joint pain and myalgias.   Neurological: Negative for dizziness and headaches.        Physical Exam  Temp:  [36.3 °C (97.3 °F)-37 °C (98.6 °F)] 36.7 °C (98.1 °F)  Pulse:  [78-89] 84  Resp:  [16-17] 16  BP: ()/(61-75) 98/65  SpO2:  [92 %-94 %] 93 %    Physical Exam  Vitals and nursing note reviewed.   Constitutional:       General: He is not in acute distress.     Appearance: Normal appearance. He is not ill-appearing or toxic-appearing.   HENT:      Head: Normocephalic and atraumatic.      Mouth/Throat:      Mouth: Mucous membranes are moist.      Pharynx: Oropharynx is clear.   Eyes:      General:         Right eye: No discharge.         Left eye: No discharge.      Extraocular Movements: Extraocular movements intact.      Conjunctiva/sclera: Conjunctivae normal.      Pupils: Pupils are equal, round, and reactive to light.   Neck:      Vascular: No hepatojugular reflux or JVD.   Cardiovascular:      Rate and Rhythm: Normal rate and regular rhythm.      Pulses: Normal pulses.      Heart sounds: Normal heart sounds. No murmur heard.      Pulmonary:      Effort: Pulmonary effort is normal. No respiratory distress.      Breath sounds: No wheezing or rales.   Abdominal:      General: Abdomen is flat. Bowel sounds are  normal. There is no distension.      Palpations: Abdomen is soft. There is no mass.      Tenderness: There is no abdominal tenderness.   Musculoskeletal:         General: Normal range of motion.      Cervical back: Neck supple. No tenderness.      Right lower leg: Edema (trace) present.      Left lower leg: Edema (trace) present.   Skin:     General: Skin is warm and dry.      Coloration: Skin is not pale.   Neurological:      General: No focal deficit present.      Mental Status: He is alert and oriented to person, place, and time.   Psychiatric:         Mood and Affect: Mood normal.         Behavior: Behavior normal.         Thought Content: Thought content normal.         Fluids    Intake/Output Summary (Last 24 hours) at 1/7/2022 1451  Last data filed at 1/7/2022 0816  Gross per 24 hour   Intake 360 ml   Output 1425 ml   Net -1065 ml       Laboratory  Recent Labs     01/05/22  0302   WBC 13.4*   RBC 4.97   HEMOGLOBIN 14.7   HEMATOCRIT 45.4   MCV 91.3   MCH 29.6   MCHC 32.4*   RDW 47.9   PLATELETCT 286   MPV 11.8     Recent Labs     01/05/22  1717 01/06/22  0754 01/07/22  0402   SODIUM 140 137 138   POTASSIUM 4.1 4.3 3.2*   CHLORIDE 98 96 97   CO2 28 27 27   GLUCOSE 84 135* 98   BUN 48* 52* 48*   CREATININE 2.39* 2.20* 1.82*   CALCIUM 9.2 8.9 8.9                   Imaging  CT-CTA HEART W/3D IMAGE   Final Result      1.  Approximately 70% stenosis in the proximal LAD secondary to mixed calcific and soft plaque. Degree of stenosis in the more distal LAD is difficult to quantitate secondary to small vessel size and dense calcific plaque.      2.  Minimal stenosis in the right coronary and left circumflex arteries.      3.  Bilateral pleural effusions with adjacent atelectasis. Mosaic attenuation in the lungs may represent pulmonary edema.      4.  Calcium Score of 100-399 AND <75th percentile:      You have significant cholesterol (plaque) build-up in the arteries that supply blood flow to your heart. This does not  mean you have any blockages in your arteries that require an intervention at this time, but you are at higher risk of developing heart    disease or a stroke. Therefore, you need to be aggressive about preventing further plaque build-up. We recommend treating this plaque with a healthy low saturated fat, low sugar, mostly plant based diet and regular exercise. We highly recommend the use    of aspirin (low dose, 81 mg once a day) and a cholesterol medication to help prevent further plaque build-up; please discuss these recommendations with your doctor. If you smoke, this likely has contributed to your cholesterol build-up, and you should    quit as soon as possible. Please let your doctor know if you need medications or other resources to help you quit. If you are overweight, we also recommend gradual weight loss until you reach a normal weight. Each of these recommendations will lower your    future risk of heart disease and stroke, and can even help decrease the amount of plaque you currently have. We do not routinely recommend any further testing based on this test result.      Guidelines based upon the American College of Cardiology 2018 recommendations.                  EC-SABRINA W/O CONT         US-RENAL   Final Result      1.  No hydronephrosis.   2.  Bilateral pleural effusions.   3.  Trace pelvic free fluid.      DX-CHEST-LIMITED (1 VIEW)   Final Result      Increasing pulmonary airspace process consistent with worsening edema      EC-ECHOCARDIOGRAM COMPLETE W/O CONT   Final Result      DX-CHEST-PORTABLE (1 VIEW)   Final Result      1.  Pulmonary vascular congestion and minimal bilateral pleural effusions.  No overt pulmonary edema.   2.  Hazy RIGHT lung base opacity, atelectasis versus pneumonia.   3.  Mild cardiomegaly.      CL-CARDIOVERSION    (Results Pending)        Assessment/Plan  * Atrial fibrillation with rapid ventricular response (HCC)- (present on admission)  Assessment & Plan  In setting of volume  overload, unclear if undiagnosed CHF led to volume overload and RVR or if uncontrolled tachyarrhythmia causing congestive heart failure.  TSH normal   Diltiazem changed to metoprolol due to Reduced EF   [id number], started on eliquis   Successful Cardioversion 1/5  Echo with EF 20%    Continue apixaban and telemetry monitoring.    Stenosis of left anterior descending (LAD) artery- (present on admission)  Assessment & Plan  As per CTA of the coronary arteries 70% stenosis.  Possible cause of patient's HFrEF.    Coronary calcification:  LMA - 0.0  LCX - 5.7  LAD - 185  RCA - 0.0  PDA - 0.0     Total Calcium Score: 190.7     Percentile: Calcium score is above the 50th percentile for the patient's age and sex.    Continue apixaban.  Plan for left heart catheterization per cardiology.  Continue rosuvastatin and metoprolol SR.    Acute systolic heart failure (HCC)- (present on admission)  Assessment & Plan  Given 70% stenosis of the LAD, suspect ischemic cardiomyopathy.  Echo showing EF 20%, pt with severe edema, JVD and bilateral pleural effusions with ARF consistent with severe volume overload   Volume status improving.    High-dose furosemide IV changed to torsemide 40 mg twice daily per cardiology 1/7.  Continue to replace electrolytes  Continue metoprolol SR 50 mg daily and losartan 12.5 mg daily.  Monitor Is/Os, fluid restriction, low salt diet, daily weights    Acute respiratory failure with hypoxia (HCC)- (present on admission)  Assessment & Plan  Secondary to volume overload in setting of acute HFrEF.  Resolved with diuresis.  Currently on room air.    Continue to monitor.    CHAYO (acute kidney injury) (HCC)- (present on admission)  Assessment & Plan  Suspecting Prerenal from vascular congestion from volume overload.  Urinalysis and CPK unremarkable.  Improving with IV diuresis.    Continue diuresis.  Continue to monitor closely.    Elevated troponin- (present on admission)  Assessment & Plan  Likely from  demand ischemia.    Continue to monitor as needed.    Hypokalemia- (present on admission)  Assessment & Plan  Secondary to IV diuresis.    Continue to replace for goal greater than 4.0.  Magnesium goal greater than 2.0.    Primary hypertension- (present on admission)  Assessment & Plan  Controlled.  Blood pressure goal less than 130/80.    Continue spironolactone, losartan, and metoprolol SR in setting of HFrEF.    Dyslipidemia- (present on admission)  Assessment & Plan  As per history.    Continue home rosuvastatin.  Check lipid panel.       VTE prophylaxis: therapeutic anticoagulation with eliquis    I have performed a physical exam and reviewed and updated ROS and Plan today (1/7/2022). In review of yesterday's note (1/6/2022), there are no changes except as documented above.

## 2022-01-07 NOTE — PROGRESS NOTES
"         [facility] Cardiology Progress Note    Name:   Miah Oneill   YOB: 1955  Age:   66 y.o.  male   MRN:   [id number]    Consulting Cardiologist: Dr Porter     Chief Complaint: progressive dyspnea, leg swelling      History of Present Illness:  66-year-old male who lives in [facility] presented to the hospital 1/3/2022 with 2-3 months of dyspnea, productive cough, 2 to 3 weeks of leg swelling and weight gain.  Found to be in atrial fibrillation with rapid ventricular response, transthoracic echocardiogram showed left and right ventricular heart failure, EF 20%, grade 2 diastolic dysfunction. He has been diuresed and scheduled for a SABRINA/CV today, 1/5.     Medical history includes a retinal occlusion several years ago.  Following this he saw a cardiologist and wore a heart monitor and had a stress test, both were reportedly normal.  He has never been told he has had a heart attack in the past.  He did not have any exertional angina before his hospital stay.    Interim Events:   1/7/2022: No overnight cardiac events. Tele monitoring personally interpreted by me shows SR 70's. VSS; 0.5 L NC-RA; Daily weight 83.2 kg. Labs reviewed; Mg- 2.3; K- 3.2-replace, BUN/Crt- 48/1.82. Coronary CTA today. Transition to PO torsemide. FU in HF clinic per below.     1/6/2022:Miah is up walking in his room this morning.  Reports improvement in orthopnea and leg swelling although both are still present.  Getting short of breath with \"organizing his room\".  Reports normal urination pattern, no increase in frequency.  Renal function is stable, no I/Os recorded but weights continue to decrease.   Remains in sinus rhythm this morning    Review of Systems   Constitutional: Negative for fever, malaise/fatigue and weight loss.   Eyes: Negative for blurred vision.   Respiratory: Negative for cough and shortness of breath.    Cardiovascular: Positive for leg swelling (\"much improved\"). Negative for chest pain, " palpitations, orthopnea, claudication and PND.   Gastrointestinal: Negative for abdominal pain, blood in stool, nausea and vomiting.   Genitourinary: Negative for dysuria, frequency and hematuria.   Musculoskeletal: Negative for falls and myalgias.   Neurological: Negative for dizziness, tingling and loss of consciousness.   Endo/Heme/Allergies: Does not bruise/bleed easily.         History reviewed. No pertinent surgical history.    History reviewed. No pertinent family history.    Social History     Tobacco Use   Smoking Status Former Smoker   • Quit date:    • Years since quittin.0   Smokeless Tobacco Never Used       No Known Allergies      Medications   No current facility-administered medications on file prior to encounter.     Current Outpatient Medications on File Prior to Encounter   Medication Sig Dispense Refill   • rosuvastatin (CRESTOR) 10 MG Tab Take 10 mg by mouth every day.     • losartan (COZAAR) 50 MG Tab Take 50 mg by mouth every day.     • furosemide (LASIX) 40 MG Tab Take 40 mg by mouth every day.     • albuterol 108 (90 Base) MCG/ACT Aero Soln inhalation aerosol Inhale 2 Puffs every 6 hours as needed for Shortness of Breath.             Physical Exam  Vitals:    22 0359   BP: (!) 92/61   Pulse: 78   Resp: 16   Temp: 36.5 °C (97.7 °F)   SpO2: 92%     Physical Exam  Vitals and nursing note reviewed.   Neck:      Vascular: No JVD.   Cardiovascular:      Rate and Rhythm: Normal rate and regular rhythm.      Pulses: Normal pulses.      Heart sounds: Normal heart sounds.   Pulmonary:      Effort: Pulmonary effort is normal. No respiratory distress.      Breath sounds: Normal breath sounds.   Musculoskeletal:      Right lower le+ Pitting Edema present.      Left lower le+ Pitting Edema present.   Neurological:      General: No focal deficit present.      Mental Status: He is alert and oriented to person, place, and time. Mental status is at baseline.   Psychiatric:         Mood  and Affect: Mood normal.         Behavior: Behavior normal.           Labs (personally reviewed):     Lab Results   Component Value Date/Time    SODIUM 138 01/07/2022 04:02 AM    POTASSIUM 3.2 (L) 01/07/2022 04:02 AM    CHLORIDE 97 01/07/2022 04:02 AM    CO2 27 01/07/2022 04:02 AM    GLUCOSE 98 01/07/2022 04:02 AM    BUN 48 (H) 01/07/2022 04:02 AM    CREATININE 1.82 (H) 01/07/2022 04:02 AM     No results found for: CHOLSTRLTOT, LDL, HDL, TRIGLYCERIDE  No results found for: BNPBTYPENAT      Cardiac Imaging and Procedures Review:      Personal Telemetry Review:   SR 74-84  (R-O) Bigem  12 bts SVT  1st degree heart block    Echo 1/4/21:    No prior study is available for comparison.   The left ventricular ejection fraction is visually estimated to be 20%.  Grade II diastolic dysfunction.  The right ventricle is dilated.  Reduced right ventricular systolic function.  Normal inferior vena cava size and inspiratory collapse.  Moderate mitral regurgitation.  Moderate tricuspid regurgitation.  Estimated right ventricular systolic pressure is 48  mmHg.       Assessment and Medical Decision Making:  #  Atrial fibrillation with RVR  - S/P successful SABRINA guided DCCV 1/5/2022.  Remains in sinus rhythm overnight.  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 Ischemic evaluation with coronary CTA today.  -ADHF Trajectory check: improved d/t UOP 1.4 L; DW 83.2 kg; Likely dry wt 82 kg  -ACE-I/ARB/ARNI: Continue losartan 12.5 mg daily  -Evidence Based Beta-blocker: Metoprolol XL 50  -Aldosterone Antagonist: Spironolactone 12.5 mg daily  -Diuretic: transition to torsemide 40 mg BID  -SGLT2i: Consider at discharge   -Patient is not a candidate for ICD placement at this time due to < 3 months GDMT  -Labs: Daily BMP  -Continue Daily weights; Strict  I+O’s:   -PNA and Flu vaccine: Both ordered on 1/6   -Meds-to-beds and HF instructions on discharge   -FU in HF clinic per below    # Renal dysfunction, unclear baseline  # High Anion gap metabolic acidosis, resolved  - Recommend continuing high-dose IV diuretics as he is warm and wet on exam, pulmonary edema on x-ray  - please continue daily weights  -Crt improving 1.82 today    # Retinal Occlusion, remote  - cont statin    Future Appointments   Date Time Provider Department Center   1/14/2022  8:45 AM MIESHA Ball. [facility] None       Please see Dr Porter's attestation for additions and further recommendations.    MIESHA Ball.   [facility]  [phone]

## 2022-01-07 NOTE — DISCHARGE PLANNING
Anticipated Discharge Disposition: Home with close outpatient f/u     Action: pt has active discharge order, pt currently on 0 liters O2, 6 clicks are 24/24. Orientation: A&Ox4.      Barriers to Discharge: Home O2 eval --- awaiting order and F2F     Plan: f/u with pt and medical team to discuss dc needs and barriers

## 2022-01-07 NOTE — ASSESSMENT & PLAN NOTE
As per CTA of the coronary arteries 70% stenosis.  Possible cause of patient's HFrEF.    Coronary calcification:  LMA - 0.0  LCX - 5.7  LAD - 185  RCA - 0.0  PDA - 0.0     Total Calcium Score: 190.7     Percentile: Calcium score is above the 50th percentile for the patient's age and sex.    Continue apixaban.  Plan for left heart catheterization per cardiology.  Continue rosuvastatin and metoprolol SR.  
As per history.    Continue home rosuvastatin.  Check lipid panel, not at goal   
Controlled.  Blood pressure goal less than 130/80.    Continue spironolactone, losartan, and metoprolol SR in setting of HFrEF.  
Given 70% stenosis of the LAD, suspect ischemic cardiomyopathy.  Echo showing EF 20%, pt with severe edema, JVD and bilateral pleural effusions with ARF consistent with severe volume overload   Volume status improving.    High-dose furosemide IV changed to torsemide 40 mg twice daily per cardiology 1/7.  Continue to replace electrolytes  Continue metoprolol SR 50 mg daily and losartan 12.5 mg daily.  Monitor Is/Os, fluid restriction, low salt diet, daily weights  
In setting of volume overload, unclear if undiagnosed CHF led to volume overload and RVR or if uncontrolled tachyarrhythmia causing congestive heart failure.  TSH normal   Diltiazem changed to metoprolol due to Reduced EF   [id number] 3, started on eliquis   Successful Cardioversion 1/5 has since maintained SR  Echo with EF 20%    Continue apixaban and telemetry monitoring.  
Likely from demand ischemia.    Continue to monitor as needed.  
Secondary to IV diuresis.    Continue to replace for goal greater than 4.0.  Magnesium goal greater than 2.0.  
Secondary to volume overload in setting of acute HFrEF.  Resolved with diuresis.  Requiring 1L at rest 2 with ambulation  Home oxygen ordered   Continue to monitor.  
Suspecting Prerenal from vascular congestion from volume overload.  Urinalysis and CPK unremarkable.  Improving with IV diuresis.    Continue diuresis.  Continue to monitor closely.  
None

## 2022-01-07 NOTE — CARE PLAN
The patient is Stable - Low risk of patient condition declining or worsening    Shift Goals  Clinical Goals: Monitor urine output  Patient Goals: Rest    Progress made toward(s) clinical / shift goals:    Problem: Knowledge Deficit - Standard  Goal: Patient and family/care givers will demonstrate understanding of plan of care, disease process/condition, diagnostic tests and medications  Outcome: Progressing     Problem: Care Map:  Day Before Discharge Optimal Outcome for the Heart Failure Patient  Goal: Day Before Discharge:  Optimal Care of the heart failure patient  Outcome: Progressing       Patient is not progressing towards the following goals:

## 2022-01-07 NOTE — CARE PLAN
The patient is Stable - Low risk of patient condition declining or worsening    Shift Goals  Clinical Goals: Monitor urine output  Patient Goals: Rest    Progress made toward(s) clinical / shift goals:    Problem: Knowledge Deficit - Standard  Goal: Patient and family/care givers will demonstrate understanding of plan of care, disease process/condition, diagnostic tests and medications  Outcome: Progressing     Problem: Care Map:  Admission Optimal Outcome for the Heart Failure Patient  Goal: Admission:  Optimal Care of the heart failure patient  Outcome: Progressing       Patient is not progressing towards the following goals:

## 2022-01-08 ENCOUNTER — APPOINTMENT (OUTPATIENT)
Dept: CARDIOLOGY | Facility: MEDICAL CENTER | Age: 67
DRG: 286 | End: 2022-01-08
Attending: NURSE PRACTITIONER
Payer: MEDICARE

## 2022-01-08 ENCOUNTER — PHARMACY VISIT (OUTPATIENT)
Dept: PHARMACY | Facility: MEDICAL CENTER | Age: 67
End: 2022-01-08
Payer: COMMERCIAL

## 2022-01-08 VITALS
BODY MASS INDEX: 26.54 KG/M2 | HEART RATE: 88 BPM | TEMPERATURE: 97.6 F | DIASTOLIC BLOOD PRESSURE: 55 MMHG | HEIGHT: 70 IN | SYSTOLIC BLOOD PRESSURE: 101 MMHG | OXYGEN SATURATION: 94 % | RESPIRATION RATE: 18 BRPM | WEIGHT: 185.41 LBS

## 2022-01-08 LAB
ACT BLD: 214 SEC (ref 74–137)
ANION GAP SERPL CALC-SCNC: 12 MMOL/L (ref 7–16)
BACTERIA BLD CULT: NORMAL
BACTERIA BLD CULT: NORMAL
BUN SERPL-MCNC: 42 MG/DL (ref 8–22)
CALCIUM SERPL-MCNC: 8.7 MG/DL (ref 8.5–10.5)
CHLORIDE SERPL-SCNC: 100 MMOL/L (ref 96–112)
CHOLEST SERPL-MCNC: 114 MG/DL (ref 100–199)
CO2 SERPL-SCNC: 27 MMOL/L (ref 20–33)
CREAT SERPL-MCNC: 1.68 MG/DL (ref 0.5–1.4)
GLUCOSE SERPL-MCNC: 103 MG/DL (ref 65–99)
HDLC SERPL-MCNC: 40 MG/DL
LDLC SERPL CALC-MCNC: 57 MG/DL
MAGNESIUM SERPL-MCNC: 2.2 MG/DL (ref 1.5–2.5)
POTASSIUM SERPL-SCNC: 4.3 MMOL/L (ref 3.6–5.5)
SIGNIFICANT IND 70042: NORMAL
SIGNIFICANT IND 70042: NORMAL
SITE SITE: NORMAL
SITE SITE: NORMAL
SODIUM SERPL-SCNC: 139 MMOL/L (ref 135–145)
SOURCE SOURCE: NORMAL
SOURCE SOURCE: NORMAL
TRIGL SERPL-MCNC: 86 MG/DL (ref 0–149)

## 2022-01-08 PROCEDURE — 99239 HOSP IP/OBS DSCHRG MGMT >30: CPT | Performed by: STUDENT IN AN ORGANIZED HEALTH CARE EDUCATION/TRAINING PROGRAM

## 2022-01-08 PROCEDURE — 700105 HCHG RX REV CODE 258: Performed by: NURSE PRACTITIONER

## 2022-01-08 PROCEDURE — A9270 NON-COVERED ITEM OR SERVICE: HCPCS | Performed by: PHYSICIAN ASSISTANT

## 2022-01-08 PROCEDURE — 83735 ASSAY OF MAGNESIUM: CPT

## 2022-01-08 PROCEDURE — RXMED WILLOW AMBULATORY MEDICATION CHARGE: Performed by: STUDENT IN AN ORGANIZED HEALTH CARE EDUCATION/TRAINING PROGRAM

## 2022-01-08 PROCEDURE — 700117 HCHG RX CONTRAST REV CODE 255: Performed by: INTERNAL MEDICINE

## 2022-01-08 PROCEDURE — 700102 HCHG RX REV CODE 250 W/ 637 OVERRIDE(OP): Performed by: STUDENT IN AN ORGANIZED HEALTH CARE EDUCATION/TRAINING PROGRAM

## 2022-01-08 PROCEDURE — 99232 SBSQ HOSP IP/OBS MODERATE 35: CPT | Mod: FS | Performed by: NURSE PRACTITIONER

## 2022-01-08 PROCEDURE — 99152 MOD SED SAME PHYS/QHP 5/>YRS: CPT | Performed by: INTERNAL MEDICINE

## 2022-01-08 PROCEDURE — 93571 IV DOP VEL&/PRESS C FLO 1ST: CPT | Mod: 26,52,LD | Performed by: INTERNAL MEDICINE

## 2022-01-08 PROCEDURE — 700102 HCHG RX REV CODE 250 W/ 637 OVERRIDE(OP)

## 2022-01-08 PROCEDURE — A9270 NON-COVERED ITEM OR SERVICE: HCPCS | Performed by: STUDENT IN AN ORGANIZED HEALTH CARE EDUCATION/TRAINING PROGRAM

## 2022-01-08 PROCEDURE — A9270 NON-COVERED ITEM OR SERVICE: HCPCS | Performed by: NURSE PRACTITIONER

## 2022-01-08 PROCEDURE — 4A023N7 MEASUREMENT OF CARDIAC SAMPLING AND PRESSURE, LEFT HEART, PERCUTANEOUS APPROACH: ICD-10-PCS | Performed by: INTERNAL MEDICINE

## 2022-01-08 PROCEDURE — 700102 HCHG RX REV CODE 250 W/ 637 OVERRIDE(OP): Performed by: PHYSICIAN ASSISTANT

## 2022-01-08 PROCEDURE — 700101 HCHG RX REV CODE 250

## 2022-01-08 PROCEDURE — 80048 BASIC METABOLIC PNL TOTAL CA: CPT

## 2022-01-08 PROCEDURE — 93458 L HRT ARTERY/VENTRICLE ANGIO: CPT | Mod: 26 | Performed by: INTERNAL MEDICINE

## 2022-01-08 PROCEDURE — B2111ZZ FLUOROSCOPY OF MULTIPLE CORONARY ARTERIES USING LOW OSMOLAR CONTRAST: ICD-10-PCS | Performed by: INTERNAL MEDICINE

## 2022-01-08 PROCEDURE — 700111 HCHG RX REV CODE 636 W/ 250 OVERRIDE (IP)

## 2022-01-08 PROCEDURE — 93458 L HRT ARTERY/VENTRICLE ANGIO: CPT

## 2022-01-08 PROCEDURE — 85347 COAGULATION TIME ACTIVATED: CPT

## 2022-01-08 PROCEDURE — 700102 HCHG RX REV CODE 250 W/ 637 OVERRIDE(OP): Performed by: NURSE PRACTITIONER

## 2022-01-08 PROCEDURE — A9270 NON-COVERED ITEM OR SERVICE: HCPCS

## 2022-01-08 PROCEDURE — 36415 COLL VENOUS BLD VENIPUNCTURE: CPT

## 2022-01-08 PROCEDURE — 80061 LIPID PANEL: CPT

## 2022-01-08 RX ORDER — VERAPAMIL HYDROCHLORIDE 2.5 MG/ML
INJECTION, SOLUTION INTRAVENOUS
Status: COMPLETED
Start: 2022-01-08 | End: 2022-01-08

## 2022-01-08 RX ORDER — LOSARTAN POTASSIUM 25 MG/1
12.5 TABLET ORAL DAILY
Qty: 30 TABLET | Refills: 1 | Status: SHIPPED | OUTPATIENT
Start: 2022-01-09 | End: 2022-01-14 | Stop reason: SDUPTHER

## 2022-01-08 RX ORDER — ASPIRIN 81 MG/1
TABLET, CHEWABLE ORAL
Status: COMPLETED
Start: 2022-01-08 | End: 2022-01-08

## 2022-01-08 RX ORDER — SPIRONOLACTONE 25 MG/1
12.5 TABLET ORAL DAILY
Qty: 30 TABLET | Refills: 3 | Status: SHIPPED | OUTPATIENT
Start: 2022-01-09 | End: 2022-01-14 | Stop reason: SDUPTHER

## 2022-01-08 RX ORDER — LIDOCAINE HYDROCHLORIDE 20 MG/ML
INJECTION, SOLUTION INFILTRATION; PERINEURAL
Status: COMPLETED
Start: 2022-01-08 | End: 2022-01-08

## 2022-01-08 RX ORDER — HEPARIN SODIUM 200 [USP'U]/100ML
INJECTION, SOLUTION INTRAVENOUS
Status: COMPLETED
Start: 2022-01-08 | End: 2022-01-08

## 2022-01-08 RX ORDER — ATORVASTATIN CALCIUM 40 MG/1
40 TABLET, FILM COATED ORAL EVERY EVENING
Status: DISCONTINUED | OUTPATIENT
Start: 2022-01-08 | End: 2022-01-08 | Stop reason: HOSPADM

## 2022-01-08 RX ORDER — MIDAZOLAM HYDROCHLORIDE 1 MG/ML
INJECTION INTRAMUSCULAR; INTRAVENOUS
Status: COMPLETED
Start: 2022-01-08 | End: 2022-01-08

## 2022-01-08 RX ORDER — TORSEMIDE 20 MG/1
40 TABLET ORAL 2 TIMES DAILY
Qty: 120 TABLET | Refills: 3 | Status: SHIPPED | OUTPATIENT
Start: 2022-01-08 | End: 2022-01-14 | Stop reason: SDUPTHER

## 2022-01-08 RX ORDER — METOPROLOL SUCCINATE 50 MG/1
50 TABLET, EXTENDED RELEASE ORAL DAILY
Qty: 30 TABLET | Refills: 1 | Status: SHIPPED | OUTPATIENT
Start: 2022-01-09 | End: 2022-01-14 | Stop reason: SDUPTHER

## 2022-01-08 RX ORDER — HEPARIN SODIUM 1000 [USP'U]/ML
INJECTION, SOLUTION INTRAVENOUS; SUBCUTANEOUS
Status: COMPLETED
Start: 2022-01-08 | End: 2022-01-08

## 2022-01-08 RX ORDER — ROSUVASTATIN CALCIUM 10 MG/1
20 TABLET, COATED ORAL DAILY
Qty: 30 TABLET | Refills: 11 | Status: SHIPPED | OUTPATIENT
Start: 2022-01-08 | End: 2022-01-14 | Stop reason: SDUPTHER

## 2022-01-08 RX ADMIN — HEPARIN SODIUM 2000 UNITS: 200 INJECTION, SOLUTION INTRAVENOUS at 10:29

## 2022-01-08 RX ADMIN — LOSARTAN POTASSIUM 12.5 MG: 25 TABLET, FILM COATED ORAL at 05:14

## 2022-01-08 RX ADMIN — FENTANYL CITRATE 25 MCG: 50 INJECTION INTRAMUSCULAR; INTRAVENOUS at 10:39

## 2022-01-08 RX ADMIN — TORSEMIDE 40 MG: 20 TABLET ORAL at 05:15

## 2022-01-08 RX ADMIN — NITROGLYCERIN 10 ML: 20 INJECTION INTRAVENOUS at 10:29

## 2022-01-08 RX ADMIN — ROSUVASTATIN CALCIUM 10 MG: 20 TABLET, FILM COATED ORAL at 05:15

## 2022-01-08 RX ADMIN — METOPROLOL SUCCINATE 50 MG: 50 TABLET, EXTENDED RELEASE ORAL at 05:15

## 2022-01-08 RX ADMIN — SODIUM CHLORIDE: 9 INJECTION, SOLUTION INTRAVENOUS at 07:35

## 2022-01-08 RX ADMIN — ASPIRIN 324 MG: 81 TABLET, CHEWABLE ORAL at 10:30

## 2022-01-08 RX ADMIN — FAMOTIDINE 20 MG: 20 TABLET ORAL at 05:15

## 2022-01-08 RX ADMIN — MIDAZOLAM 1 MG: 1 INJECTION INTRAMUSCULAR; INTRAVENOUS at 10:39

## 2022-01-08 RX ADMIN — LIDOCAINE HYDROCHLORIDE: 20 INJECTION, SOLUTION INFILTRATION; PERINEURAL at 10:29

## 2022-01-08 RX ADMIN — GUAIFENESIN 600 MG: 600 TABLET, EXTENDED RELEASE ORAL at 05:15

## 2022-01-08 RX ADMIN — SPIRONOLACTONE 12.5 MG: 25 TABLET ORAL at 05:15

## 2022-01-08 RX ADMIN — VERAPAMIL HYDROCHLORIDE 2.5 MG: 2.5 INJECTION, SOLUTION INTRAVENOUS at 10:29

## 2022-01-08 RX ADMIN — HEPARIN SODIUM: 1000 INJECTION, SOLUTION INTRAVENOUS; SUBCUTANEOUS at 10:29

## 2022-01-08 RX ADMIN — IOHEXOL 70 ML: 350 INJECTION, SOLUTION INTRAVENOUS at 10:40

## 2022-01-08 ASSESSMENT — ENCOUNTER SYMPTOMS
FEVER: 0
MYALGIAS: 0
PALPITATIONS: 0
HEMOPTYSIS: 0
SHORTNESS OF BREATH: 0
PND: 0
WHEEZING: 0
HEADACHES: 0
DIARRHEA: 0
ORTHOPNEA: 0
SPUTUM PRODUCTION: 0
DIZZINESS: 0
CONSTIPATION: 0
NAUSEA: 0
CHILLS: 0
COUGH: 0
VOMITING: 0
CLAUDICATION: 0

## 2022-01-08 NOTE — FACE TO FACE
"Face to Face Note  -  Durable Medical Equipment    Erin Otto M.D. - NPI: [id number]  I certify that this patient is under my care and that they had a durable medical equipment(DME)face to face encounter by myself that meets the physician DME face-to-face encounter requirements with this patient on:    Date of encounter:   Patient:                    MRN:                       YOB: 2021  Miah Oneill  [id number]  1955     The encounter with the patient was in whole, or in part, for the following medical condition, which is the primary reason for durable medical equipment:  CHF    I certify that, based on my findings, the following durable medical equipment is medically necessary:  Oxygen.    HOME O2 Saturation Measurements:(Values must be present for Home Oxygen orders)  Room air sat at rest: 92  Room air sat with amb: 84  With liters of O2: 1, O2 sat at rest with O2: 91  With Liters of O2: 2, O2 sat with amb with O2 : 90  Is the patient mobile?: Yes    My Clinical findings support the need for the above equipment due to:  Hypoxia    Supporting Symptoms: The patient requires supplemental oxygen, as the following interventions have been tried with limited or no improvement: \"Ambulation with oximetry and \"Incentive spirometry    If patient feels more short of breath, they can go up to 6 liters per minute and contact healthcare provider.  "

## 2022-01-08 NOTE — DISCHARGE SUMMARY
Discharge Summary    CHIEF COMPLAINT ON ADMISSION  Chief Complaint   Patient presents with   • Shortness of Breath     progressively worse x3 months. Started on Prednisone by his NP and given an albuterol inhaler. Pt had home oxygen monitoring and states his oxygen saturations dip below 80% while sleeping. Pt states he has also been retaining fluid since starting Prednisone.     • Leg Swelling       Reason for Admission  SOB     Admission Date  1/3/2022    CODE STATUS  Full Code    HPI & HOSPITAL COURSE  This is a 66 y.o. male with a past medical history of hypertension and hyperlipidemia initially presented to the hospital on 1/3/2022 with dyspnea, coughing and lower extremity edema as well as weight gain over the past 1 week.  On arrival patient was noted to be in atrial fibrillation with rapid ventricular response and found to have acute kidney injury.  Patient was hypoxic requiring supplemental oxygen.  Cardiology was consulted regarding patient's atrial fibrillation with rapid ventricular response for which she was started on metoprolol.  Patient did undergo cardioversion with restoration of sinus rhythm.  He did have an echocardiogram which showed severely reduced ejection fraction of 20% possibly secondary to tachycardic induced cardiomyopathy.  He did undergo coronary CT scan which did show proximal stenosis of the LAD at 70% as well as an elevated calcium score he subsequently underwent a left heart catheterization which showed nonobstructive coronary artery disease and no intervention was done.  Patient was aggressively treated with IV Lasix with improvement in his oxygen requirements as well as his edema.  This was transition to oral furosemide.  He was started on heart failure medications per protocol.  He will follow up with heart failure clinic in approximately 1 week.  Patient was requiring oxygen upon discharge this was provided.  He was educated on signs and symptoms that should prompt him to seek  medical attention in addition he was provided with appropriate discharge paperwork for heart failure.      Therefore, he is discharged in fair and stable condition to home with close outpatient follow-up.    The patient met 2-midnight criteria for an inpatient stay at the time of discharge.    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 *      FOLLOW UP  Future Appointments   Date Time Provider Department Center   1/14/2022  8:45 AM SHARAN Ball [facility] None     MIESHA Ball.  [address] [address]  [facility] [address]  [phone]    Go on 1/14/2022  go to appointment at 8:45      MEDICATIONS ON DISCHARGE     Medication List      START taking these medications      Instructions   apixaban 5mg Tabs  Commonly known as: ELIQUIS   Take 1 Tablet by mouth 2 times a day. Indications: Thromboembolism secondary to Atrial Fibrillation  Dose: 5 mg     metoprolol SR 50 MG Tb24  Start taking on: January 9, 2022  Commonly known as: TOPROL XL   Take 1 Tablet by mouth every day.  Dose: 50 mg     spironolactone 25 MG Tabs  Start taking on: January 9, 2022  Commonly known as: ALDACTONE   Take 0.5 Tablets by mouth every day.  Dose: 12.5 mg     torsemide 20 MG Tabs  Commonly known as: DEMADEX   Take 2 Tablets by mouth 2 times a day.  Dose: 40 mg        CHANGE how you take these medications       Instructions   losartan 25 MG Tabs  Start taking on: January 9, 2022  What changed:   · medication strength  · how much to take  Commonly known as: COZAAR   Take 0.5 Tablets by mouth every day.  Dose: 12.5 mg     rosuvastatin 10 MG Tabs  What changed: how much to take  Commonly known as: CRESTOR   Take 2 Tablets by mouth every day.  Dose: 20 mg        CONTINUE taking these medications      Instructions   albuterol 108 (90 Base) MCG/ACT Aers inhalation aerosol   Inhale 2 Puffs every 6 hours as needed for Shortness of Breath.  Dose: 2 Puff        STOP taking these medications    furosemide 40 MG Tabs  Commonly known as: LASIX            Allergies  No Known Allergies    DIET  Orders Placed This Encounter   Procedures   • Diet Order Diet: Cardiac     Standing Status:   Standing     Number of Occurrences:   1     Order Specific Question:   Diet:     Answer:   Cardiac [6]       ACTIVITY  As tolerated.      CONSULTATIONS  Cardiology    PROCEDURES  External cardioversion on 1/5/2022    PROCEDURES PERFORMED: 1/8/2022  1. Left heart catheterization  2. Coronary angiography  3. Instantaneous wave-free ratio assessment of the mid left anterior descending coronary artery  4. Moderate conscious sedation        LABORATORY  Lab Results   Component Value Date    SODIUM 139 01/08/2022    POTASSIUM 4.3 01/08/2022    CHLORIDE 100 01/08/2022    CO2 27 01/08/2022    GLUCOSE 103 (H) 01/08/2022    BUN 42 (H) 01/08/2022    CREATININE 1.68 (H) 01/08/2022        Lab Results   Component Value Date    WBC 13.4 (H) 01/05/2022    HEMOGLOBIN 14.7 01/05/2022    HEMATOCRIT 45.4 01/05/2022    PLATELETCT 286 01/05/2022        Total time of the discharge process exceeds 38 minutes.

## 2022-01-08 NOTE — PROGRESS NOTES
Hospital Medicine Daily Progress Note    Date of Service  1/8/2022    Chief Complaint  Miah Oneill is a 66 y.o. male admitted 1/3/2022 with sob    Hospital Course  66 y.o. male history of hypertension and hyperlipidemia who presented 1/3/2022 with dyspnea, coughing, BLE swelling and weight gain 20 lbs in the past week.   He is noted to have new onset A. fib with RVR and CHAYO.  Mr. Oneill states that he has been feeling chest congestion since August.  Patient is pretty adamant that he has a bronchitis/pneumonia since August, which is progressively worsening.  He has associated orthopnea, paroxysmal nocturnal dyspnea, nocturnal hypoxia noted on home overnight pulse ox evaluation.   Cardiology was consulted and patient was started on oral metoprolol with plan for cardioversion. He was also started on aggressive diuresis due to volume overload. echocardiogram was done and showed severely reduced EF 20% possibly due to tachycardic induced cardiomyopathy. He underwent coronary CT which showed stenosis of proximal LAD and elevated calcium score, cardiology thus recommended left heart catheterization.     Interval Problem Update  Patient seen and examined, no acute events overnight, overall feeling well, still with some exertional dyspnea, still with LE edema   - IV lasix switched to oral torsemide   - renal function continues to improve   - [facility] today, further plan of care pending results/intervention   - cardiology continues to follow   - still requiring oxygen, Home oxygen/DME order placed       All Data, Medication data reviewed.  I have personally seen and examined the patient at bedside. I discussed the plan of care with patient, family, bedside RN, charge RN,  and pharmacy.    Consultants/Specialty  cardiology     Code Status  Full Code    Disposition  Patient is not medically cleared for discharge.   Anticipate discharge to to home with close outpatient follow-up.  I have placed the  appropriate orders for post-discharge needs.    Review of Systems  Review of Systems   Constitutional: Negative for chills, fever and malaise/fatigue.   Respiratory: Negative for cough and shortness of breath.    Cardiovascular: Positive for leg swelling (improving). Negative for chest pain and palpitations.   Gastrointestinal: Negative for constipation, diarrhea, nausea and vomiting.   Genitourinary: Positive for frequency. Negative for dysuria.   Musculoskeletal: Negative for joint pain and myalgias.   Neurological: Negative for dizziness and headaches.        Physical Exam  Temp:  [36.4 °C (97.6 °F)-37 °C (98.6 °F)] 36.4 °C (97.6 °F)  Pulse:  [] 88  Resp:  [16-18] 18  BP: ()/(55-75) 101/55  SpO2:  [91 %-95 %] 95 %    Physical Exam  Vitals and nursing note reviewed.   Constitutional:       General: He is not in acute distress.     Appearance: Normal appearance. He is not ill-appearing or toxic-appearing.   HENT:      Head: Normocephalic and atraumatic.      Mouth/Throat:      Mouth: Mucous membranes are moist.      Pharynx: Oropharynx is clear.   Eyes:      General:         Right eye: No discharge.         Left eye: No discharge.      Extraocular Movements: Extraocular movements intact.      Conjunctiva/sclera: Conjunctivae normal.      Pupils: Pupils are equal, round, and reactive to light.   Neck:      Vascular: No hepatojugular reflux or JVD.   Cardiovascular:      Rate and Rhythm: Normal rate and regular rhythm.      Pulses: Normal pulses.      Heart sounds: Normal heart sounds. No murmur heard.      Pulmonary:      Effort: Pulmonary effort is normal. No respiratory distress.      Breath sounds: No wheezing or rales.   Abdominal:      General: Abdomen is flat. Bowel sounds are normal. There is no distension.      Palpations: Abdomen is soft. There is no mass.      Tenderness: There is no abdominal tenderness.   Musculoskeletal:         General: Normal range of motion.      Cervical back: Neck  supple. No tenderness.      Right lower leg: Edema present.      Left lower leg: Edema present.      Comments: 2+ pitting edema   Skin:     General: Skin is warm and dry.      Coloration: Skin is not pale.   Neurological:      General: No focal deficit present.      Mental Status: He is alert and oriented to person, place, and time.   Psychiatric:         Mood and Affect: Mood normal.         Behavior: Behavior normal.         Thought Content: Thought content normal.         Fluids    Intake/Output Summary (Last 24 hours) at 1/8/2022 1034  Last data filed at 1/7/2022 1630  Gross per 24 hour   Intake 240 ml   Output --   Net 240 ml       Laboratory      Recent Labs     01/06/22  0754 01/07/22  0402 01/08/22  0451   SODIUM 137 138 139   POTASSIUM 4.3 3.2* 4.3   CHLORIDE 96 97 100   CO2 27 27 27   GLUCOSE 135* 98 103*   BUN 52* 48* 42*   CREATININE 2.20* 1.82* 1.68*   CALCIUM 8.9 8.9 8.7             Recent Labs     01/08/22  0451   TRIGLYCERIDE 86   HDL 40   LDL 57       Imaging  CT-CTA HEART W/3D IMAGE   Final Result      1.  Approximately 70% stenosis in the proximal LAD secondary to mixed calcific and soft plaque. Degree of stenosis in the more distal LAD is difficult to quantitate secondary to small vessel size and dense calcific plaque.      2.  Minimal stenosis in the right coronary and left circumflex arteries.      3.  Bilateral pleural effusions with adjacent atelectasis. Mosaic attenuation in the lungs may represent pulmonary edema.      4.  Calcium Score of 100-399 AND <75th percentile:      You have significant cholesterol (plaque) build-up in the arteries that supply blood flow to your heart. This does not mean you have any blockages in your arteries that require an intervention at this time, but you are at higher risk of developing heart    disease or a stroke. Therefore, you need to be aggressive about preventing further plaque build-up. We recommend treating this plaque with a healthy low saturated fat,  low sugar, mostly plant based diet and regular exercise. We highly recommend the use    of aspirin (low dose, 81 mg once a day) and a cholesterol medication to help prevent further plaque build-up; please discuss these recommendations with your doctor. If you smoke, this likely has contributed to your cholesterol build-up, and you should    quit as soon as possible. Please let your doctor know if you need medications or other resources to help you quit. If you are overweight, we also recommend gradual weight loss until you reach a normal weight. Each of these recommendations will lower your    future risk of heart disease and stroke, and can even help decrease the amount of plaque you currently have. We do not routinely recommend any further testing based on this test result.      Guidelines based upon the American College of Cardiology 2018 recommendations.                  EC-SABRINA W/O CONT         US-RENAL   Final Result      1.  No hydronephrosis.   2.  Bilateral pleural effusions.   3.  Trace pelvic free fluid.      DX-CHEST-LIMITED (1 VIEW)   Final Result      Increasing pulmonary airspace process consistent with worsening edema      EC-ECHOCARDIOGRAM COMPLETE W/O CONT   Final Result      DX-CHEST-PORTABLE (1 VIEW)   Final Result      1.  Pulmonary vascular congestion and minimal bilateral pleural effusions.  No overt pulmonary edema.   2.  Hazy RIGHT lung base opacity, atelectasis versus pneumonia.   3.  Mild cardiomegaly.      CL-CARDIOVERSION    (Results Pending)   CL-LEFT HEART CATHETERIZATION WITH POSSIBLE INTERVENTION    (Results Pending)   CL-LEFT HEART CATHETERIZATION WITH POSSIBLE INTERVENTION    (Results Pending)        Assessment/Plan  * Atrial fibrillation with rapid ventricular response (HCC)- (present on admission)  Assessment & Plan  In setting of volume overload, unclear if undiagnosed CHF led to volume overload and RVR or if uncontrolled tachyarrhythmia causing congestive heart failure.  TSH  normal   Diltiazem changed to metoprolol due to Reduced EF   [id number] 3, started on eliquis   Successful Cardioversion 1/5 has since maintained SR  Echo with EF 20%    Continue apixaban and telemetry monitoring.    Hypokalemia- (present on admission)  Assessment & Plan  Secondary to IV diuresis.    Continue to replace for goal greater than 4.0.  Magnesium goal greater than 2.0.    Stenosis of left anterior descending (LAD) artery- (present on admission)  Assessment & Plan  As per CTA of the coronary arteries 70% stenosis.  Possible cause of patient's HFrEF.    Coronary calcification:  LMA - 0.0  LCX - 5.7  LAD - 185  RCA - 0.0  PDA - 0.0     Total Calcium Score: 190.7     Percentile: Calcium score is above the 50th percentile for the patient's age and sex.    Continue apixaban.  Plan for left heart catheterization per cardiology.  Continue rosuvastatin and metoprolol SR.    Elevated troponin- (present on admission)  Assessment & Plan  Likely from demand ischemia.    Continue to monitor as needed.    Acute systolic heart failure (HCC)- (present on admission)  Assessment & Plan  Given 70% stenosis of the LAD, suspect ischemic cardiomyopathy.  Echo showing EF 20%, pt with severe edema, JVD and bilateral pleural effusions with ARF consistent with severe volume overload   Volume status improving.    High-dose furosemide IV changed to torsemide 40 mg twice daily per cardiology 1/7.  Continue to replace electrolytes  Continue metoprolol SR 50 mg daily and losartan 12.5 mg daily.  Monitor Is/Os, fluid restriction, low salt diet, daily weights    Acute respiratory failure with hypoxia (HCC)- (present on admission)  Assessment & Plan  Secondary to volume overload in setting of acute HFrEF.  Resolved with diuresis.  Requiring 1L at rest 2 with ambulation  Home oxygen ordered   Continue to monitor.    CHAYO (acute kidney injury) (HCC)- (present on admission)  Assessment & Plan  Suspecting Prerenal from vascular congestion  from volume overload.  Urinalysis and CPK unremarkable.  Improving with IV diuresis.    Continue diuresis.  Continue to monitor closely.    Primary hypertension- (present on admission)  Assessment & Plan  Controlled.  Blood pressure goal less than 130/80.    Continue spironolactone, losartan, and metoprolol SR in setting of HFrEF.    Dyslipidemia- (present on admission)  Assessment & Plan  As per history.    Continue home rosuvastatin.  Check lipid panel, not at goal        VTE prophylaxis: therapeutic anticoagulation with eliquis    I have performed a physical exam and reviewed and updated ROS and Plan today (1/8/2022). In review of yesterday's note (1/7/2022), there are no changes except as documented above.

## 2022-01-08 NOTE — PROGRESS NOTES
Monitor Summary:   SR 73-93  (R/O) PVC (R) PAC (R) coup (R) trip  Burst of a-fib  .16/.06/.38

## 2022-01-08 NOTE — DISCHARGE PLANNING
Received Choice form at 0896  Agency/Facility Name: [facility]  Referral sent per Choice form @ 1801

## 2022-01-08 NOTE — DISCHARGE INSTRUCTIONS
Atrial Fibrillation    Atrial fibrillation is a type of heartbeat that is irregular or fast (rapid). If you have this condition, your heart beats without any order. This makes it hard for your heart to pump blood in a normal way. Having this condition gives you more risk for stroke, heart failure, and other heart problems.  Atrial fibrillation may start all of a sudden and then stop on its own, or it may become a long-lasting problem.  What are the causes?  This condition may be caused by heart conditions, such as:  · High blood pressure.  · Heart failure.  · Heart valve disease.  · Heart surgery.  Other causes include:  · Pneumonia.  · Obstructive sleep apnea.  · Lung cancer.  · Thyroid disease.  · Drinking too much alcohol.  Sometimes the cause is not known.  What increases the risk?  You are more likely to develop this condition if:  · You smoke.  · You are older.  · You have diabetes.  · You are overweight.  · You have a family history of this condition.  · You exercise often and hard.  What are the signs or symptoms?  Common symptoms of this condition include:  · A feeling like your heart is beating very fast.  · Chest pain.  · Feeling short of breath.  · Feeling light-headed or weak.  · Getting tired easily.  Follow these instructions at home:  Medicines  · Take over-the-counter and prescription medicines only as told by your doctor.  · If your doctor gives you a blood-thinning medicine, take it exactly as told. Taking too much of it can cause bleeding. Taking too little of it does not protect you against clots. Clots can cause a stroke.  Lifestyle         · Do not use any tobacco products. These include cigarettes, chewing tobacco, and e-cigarettes. If you need help quitting, ask your doctor.  · Do not drink alcohol.  · Do not drink beverages that have caffeine. These include coffee, soda, and tea.  · Follow diet instructions as told by your doctor.  · Exercise regularly as told by your doctor.  General  "instructions  · If you have a condition that causes breathing to stop for a short period of time (apnea), treat it as told by your doctor.  · Keep a healthy weight. Do not use diet pills unless your doctor says they are safe for you. Diet pills may make heart problems worse.  · Keep all follow-up visits as told by your doctor. This is important.  Contact a doctor if:  · You notice a change in the speed, rhythm, or strength of your heartbeat.  · You are taking a blood-thinning medicine and you see more bruising.  · You get tired more easily when you move or exercise.  · You have a sudden change in weight.  Get help right away if:    · You have pain in your chest or your belly (abdomen).  · You have trouble breathing.  · You have blood in your vomit, poop, or pee (urine).  · You have any signs of a stroke. \"BE FAST\" is an easy way to remember the main warning signs:  ? B - Balance. Signs are dizziness, sudden trouble walking, or loss of balance.  ? E - Eyes. Signs are trouble seeing or a change in how you see.  ? F - Face. Signs are sudden weakness or loss of feeling in the face, or the face or eyelid drooping on one side.  ? A - Arms. Signs are weakness or loss of feeling in an arm. This happens suddenly and usually on one side of the body.  ? S - Speech. Signs are sudden trouble speaking, slurred speech, or trouble understanding what people say.  ? T - Time. Time to call emergency services. Write down what time symptoms started.  · You have other signs of a stroke, such as:  ? A sudden, very bad headache with no known cause.  ? Feeling sick to your stomach (nausea).  ? Throwing up (vomiting).  ? Jerky movements you cannot control (seizure).  These symptoms may be an emergency. Do not wait to see if the symptoms will go away. Get medical help right away. Call your local emergency services (911 in the [place] Do not drive yourself to the hospital.  Summary  · Atrial fibrillation is a type of heartbeat that is irregular " or fast (rapid).  · You are at higher risk of this condition if you smoke, are older, have diabetes, or are overweight.  · Follow your doctor's instructions about medicines, diet, exercise, and follow-up visits.  · Get help right away if you think that you have signs of a stroke.  This information is not intended to replace advice given to you by your health care provider. Make sure you discuss any questions you have with your health care provider.  Document Released: 09/26/2009 Document Revised: 02/21/2019 Document Reviewed: 02/08/2019  Elsevier Patient Education © 2020 [employer]        Cholesterol  Cholesterol is a fat. Your body needs a small amount of cholesterol. Cholesterol may build up in your blood vessels. This increases your chance of having a heart attack or stroke.  You cannot feel your cholesterol levels. The only way to know your cholesterol level is high is with a blood test. Keep your test results. Work with your doctor to keep your cholesterol at a good level.  WHAT DO THE TEST RESULTS MEAN?  · Total cholesterol is how much cholesterol is in your blood.  · LDL is bad cholesterol. This is the type that can build up. You want LDL to be low.  · HDL is good cholesterol. It cleans your blood vessels and carries LDL away. You want HDL to be high.  · Triglycerides are fat that the body can burn for energy or store.  WHAT ARE GOOD LEVELS OF CHOLESTEROL?  · Total cholesterol below 200.  · LDL below 100 for people at risk. Below 70 for those at very high risk.  · HDL above 50 is good. Above 60 is best.  · Triglycerides below 150.  HOW CAN I LOWER MY CHOLESTEROL?  · Diet. Follow your diet programs as told by your doctor.  ¨ Choose fish, white meat chicken, roasted turkey, or baked turkey. Try not to eat red meat, fried foods, or processed meats such as sausage and lunch meats.  ¨ Eat lots of fresh fruits and vegetables.  ¨ Choose whole grains, beans, pasta, potatoes, and cereals.  ¨ Use only small amounts  of olive, corn, or canola oils.  ¨ Try not to eat butter, mayonnaise, shortening, or palm kernel oils.  ¨ Try not to eat foods with trans fats.  ¨ Drink skim or nonfat milk. Eat low-fat or nonfat yogurt and cheeses. Try not to drink whole milk or cream. Try not to eat ice cream, egg yolks, and full-fat cheeses.  ¨ Healthy desserts include moisés food cake, christiano snaps, animal crackers, hard candy, popsicles, and low-fat or nonfat frozen yogurt. Try not to eat pastries, cakes, pies, and cookies.  · Exercise. Follow your exercise programs as told by your doctor.  ¨ Be more active. You can try gardening, walking, or taking the stairs. Ask your doctor about how you can be more active.  · Medicine. Take medicine as told by your doctor.     This information is not intended to replace advice given to you by your health care provider. Make sure you discuss any questions you have with your health care provider.     Document Released: 03/16/2010 Document Revised: 01/08/2016 Document Reviewed: 10/01/2014  [employer] Patient Education ©2016 [employer]    Discharge Instructions    Discharged to home by car with relative. Discharged via wheelchair, hospital escort: Yes.  Special equipment needed: Oxygen    Be sure to schedule a follow-up appointment with your primary care doctor or any specialists as instructed.     Discharge Plan:   Influenza Vaccine Given - only chart on this line when given: Influenza Vaccine Given (See MAR)    I understand that a diet low in cholesterol, fat, and sodium is recommended for good health. Unless I have been given specific instructions below for another diet, I accept this instruction as my diet prescription.   Other diet: cardiac    Special Instructions: Diagnosis:  Acute Coronary Syndrome (ACS) is a diagnosis that encompasses cardiac-related chest pain and heart attack. ACS occurs when the blood flow to the heart muscle is severely reduced or cut off completely due to a slow process  called atherosclerosis.  Atherosclerosis is a disease in which the coronary arteries become narrow from a buildup of fat, cholesterol, and other substances that combine to form plaque. If the plaque breaks, a blood clot will form and block the blood flow to the heart muscle. This lack of blood flow can cause damage or death to the heart muscle which is called a heart attack or Myocardial Infarction (MI). There are two different types of MIs:  ST Elevation Myocardial Infarction or STEMI (the most severe type of heart attack) and Non-ST Elevation Myocardial Infarction or NSTEMI.    Treatment Plan:  · Cardiac Diet  - Low fat, low salt, low cholesterol   · Cardiac Rehab  - Your doctor has ordered you a referral to [facility].  Call [phone] to schedule an appointment.  · Attend my follow-up appointment with my Cardiologist.  · Take my medications as prescribed by my doctor  · Exercise daily  · Lower my bad cholesterol and raise my good cholesterol, lower my blood pressure and Reduce stress    Medications:  Certain medications are used to treat ACS.  Remember to always take medications as prescribed and never stop talking medications unless told by your doctor.    You have been prescribed the following medicatons:    Anti-platelet/blood thinner - Your Anti-platelet/Blood thinning medication is called Eliquis, and is used in combination with aspirin to prevent clots from forming in your heart and/or around your stent.  Your doctor will determine how long you need to be on this medicine.  Beta-Blocker - Beta-Blocker Metoprolol is used to lower blood pressure and heart rate, and/or helps your heart heal after a heart attack.  Angiotensin Receptor Blocker (ARB) - Angiotensin Receptor Blocker Losartan is used to lower blood pressure and treat heart failure.    · Is patient discharged on Warfarin / Coumadin?   No     Depression / Suicide Risk    As you are discharged from this Critical access hospital facility, it is important to learn  how to keep safe from harming yourself.    Recognize the warning signs:  · Abrupt changes in personality, positive or negative- including increase in energy   · Giving away possessions  · Change in eating patterns- significant weight changes-  positive or negative  · Change in sleeping patterns- unable to sleep or sleeping all the time   · Unwillingness or inability to communicate  · Depression  · Unusual sadness, discouragement and loneliness  · Talk of wanting to die  · Neglect of personal appearance   · Rebelliousness- reckless behavior  · Withdrawal from people/activities they love  · Confusion- inability to concentrate     If you or a loved one observes any of these behaviors or has concerns about self-harm, here's what you can do:  · Talk about it- your feelings and reasons for harming yourself  · Remove any means that you might use to hurt yourself (examples: pills, rope, extension cords, firearm)  · Get professional help from the community (Mental Health, Substance Abuse, psychological counseling)  · Do not be alone:Call your Safe Contact- someone whom you trust who will be there for you.  · Call your local CRISIS HOTLINE 172-5768 or 333-249-8124  · Call your local Children's Mobile Crisis Response Team [place] (550) 012-1172 or www.Clinicbook  · Call the toll free National Suicide Prevention Hotlines   · National Suicide Prevention Lifeline 810-018-PUCM (5236)  · National Hope Line Network 800-SUICIDE (447-4031)

## 2022-01-08 NOTE — PROGRESS NOTES
"         [facility] Cardiology Progress Note    Name:   Miah Oneill   YOB: 1955  Age:   66 y.o.  male   MRN:   [id number]    Consulting Cardiologist: Dr Porter     Chief Complaint: progressive dyspnea, leg swelling      History of Present Illness:  66-year-old male who lives in [facility] presented to the hospital 1/3/2022 with 2-3 months of dyspnea, productive cough, 2 to 3 weeks of leg swelling and weight gain.  Found to be in atrial fibrillation with rapid ventricular response, transthoracic echocardiogram showed left and right ventricular heart failure, EF 20%, grade 2 diastolic dysfunction.     Medical history includes a retinal occlusion several years ago.  Following this he saw a cardiologist and wore a heart monitor and had a stress test, both were reportedly normal.  He has never been told he has had a heart attack in the past.  He did not have any exertional angina before his hospital stay.    Interim Events:   1/8/2022: SR 70-90s PVCs, PACs  No cardiac events overnight  Cardiac catheterization today    1/7/2022: No overnight cardiac events. Tele monitoring personally interpreted by me shows SR 70's. VSS; 0.5 L NC-RA; Daily weight 83.2 kg. Labs reviewed; Mg- 2.3; K- 3.2-replace, BUN/Crt- 48/1.82. Coronary CTA today. Transition to PO torsemide. FU in HF clinic per below.     1/6/2022:Miah is up walking in his room this morning.  Reports improvement in orthopnea and leg swelling although both are still present.  Getting short of breath with \"organizing his room\".  Reports normal urination pattern, no increase in frequency.  Renal function is stable, no I/Os recorded but weights continue to decrease.   Remains in sinus rhythm this morning    Review of Systems   Constitutional: Negative for chills and fever.   Respiratory: Negative for cough, hemoptysis, sputum production, shortness of breath and wheezing.    Cardiovascular: Positive for leg swelling. Negative for chest pain, " palpitations, orthopnea, claudication and PND.   Gastrointestinal: Negative for nausea and vomiting.   Neurological: Negative for dizziness and headaches.   All other systems reviewed and are negative.        History reviewed. No pertinent surgical history.    History reviewed. No pertinent family history.    Social History     Tobacco Use   Smoking Status Former Smoker   • Quit date:    • Years since quittin.0   Smokeless Tobacco Never Used       No Known Allergies      Medications   No current facility-administered medications on file prior to encounter.     Current Outpatient Medications on File Prior to Encounter   Medication Sig Dispense Refill   • rosuvastatin (CRESTOR) 10 MG Tab Take 10 mg by mouth every day.     • losartan (COZAAR) 50 MG Tab Take 50 mg by mouth every day.     • furosemide (LASIX) 40 MG Tab Take 40 mg by mouth every day.     • albuterol 108 (90 Base) MCG/ACT Aero Soln inhalation aerosol Inhale 2 Puffs every 6 hours as needed for Shortness of Breath.             Physical Exam  Vitals:    22 0515   BP: (!) 95/65   Pulse: 81   Resp:    Temp:    SpO2:      Physical Exam  Vitals and nursing note reviewed.   Constitutional:       Appearance: Normal appearance.   HENT:      Head: Normocephalic and atraumatic.   Eyes:      Extraocular Movements: Extraocular movements intact.   Cardiovascular:      Pulses: Normal pulses.           Radial pulses are 2+ on the right side and 2+ on the left side.      Heart sounds: Normal heart sounds.      Comments: 2+ BLE edema  Pulmonary:      Effort: Pulmonary effort is normal.      Breath sounds: Normal breath sounds.   Abdominal:      General: Bowel sounds are normal.      Palpations: Abdomen is soft.   Musculoskeletal:         General: Normal range of motion.   Skin:     General: Skin is warm.   Neurological:      Mental Status: He is alert and oriented to person, place, and time.   Psychiatric:         Mood and Affect: Mood normal.          Behavior: Behavior normal.           Labs (personally reviewed):     Lab Results   Component Value Date/Time    SODIUM 139 01/08/2022 04:51 AM    POTASSIUM 4.3 01/08/2022 04:51 AM    CHLORIDE 100 01/08/2022 04:51 AM    CO2 27 01/08/2022 04:51 AM    GLUCOSE 103 (H) 01/08/2022 04:51 AM    BUN 42 (H) 01/08/2022 04:51 AM    CREATININE 1.68 (H) 01/08/2022 04:51 AM     Lab Results   Component Value Date/Time    CHOLSTRLTOT 114 01/08/2022 04:51 AM    LDL 57 01/08/2022 04:51 AM    HDL 40 01/08/2022 04:51 AM    TRIGLYCERIDE 86 01/08/2022 04:51 AM     No results found for: BNPBTYPENAT      Cardiac Imaging and Procedures Review:      Personal Telemetry Review:   SR 74-84  (R-O) Bigem  12 bts SVT  1st degree heart block    Echo 1/4/21:  No prior study is available for comparison.   The left ventricular ejection fraction is visually estimated to be 20%.  Grade II diastolic dysfunction.  The right ventricle is dilated.  Reduced right ventricular systolic function.  Normal inferior vena cava size and inspiratory collapse.  Moderate mitral regurgitation.  Moderate tricuspid regurgitation.  Estimated right ventricular systolic pressure is 48  mmHg.       Assessment and Medical Decision Making:  #  Atrial fibrillation with RVR  - S/P successful SABRINA guided DCCV 1/5/2022.  Remains in sinus rhythm.  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 Ischemic evaluation with coronary CTA today.  -ADHF Trajectory check: improved d/t UOP 1.4 L; DW 83.2 kg; Likely dry wt 82 kg  -ACE-I/ARB/ARNI: Continue losartan 12.5 mg daily  -Evidence Based Beta-blocker: Metoprolol XL 50  -Aldosterone Antagonist: Spironolactone 12.5 mg daily  -Diuretic: transition to torsemide 40 mg BID  -SGLT2i: Consider at discharge   -Patient is not a candidate for ICD placement at this  time due to < 3 months GDMT  -Labs: Daily BMP  -Continue Daily weights; Strict I+O’s:   -PNA and Flu vaccine: Both ordered on 1/6   -Meds-to-beds and HF instructions on discharge   -FU in HF clinic per below  -OK to DC, Nonobstructive disease of LAD    # Renal dysfunction, unclear baseline  # High Anion gap metabolic acidosis, resolved  - Recommend continuing high-dose IV diuretics as he is warm and wet on exam, pulmonary edema on x-ray  - please continue daily weights  -Crt improving 1.68 today    # Retinal Occlusion, remote  - cont statin    Future Appointments   Date Time Provider Department Center   1/14/2022  8:45 AM SHARAN Ball [facility] None       Please see Dr Porter's attestation for additions and further recommendations.    MIESHA Ball.   [facility]  [phone]

## 2022-01-08 NOTE — CARE PLAN
The patient is Stable - Low risk of patient condition declining or worsening    Shift Goals  Clinical Goals: maintain hemodynamic stability  Patient Goals: go home  Family Goals: MIGUEL    Progress made toward(s) clinical / shift goals:    Waiting on cath prior to discharge    Problem: Knowledge Deficit - Standard  Goal: Patient and family/care givers will demonstrate understanding of plan of care, disease process/condition, diagnostic tests and medications  Outcome: Progressing     Problem: Care Map:  Day Before Discharge Optimal Outcome for the Heart Failure Patient  Goal: Day Before Discharge:  Optimal Care of the heart failure patient  Outcome: Progressing

## 2022-01-08 NOTE — PROCEDURES
Cardiac Catheterization Laboratory Procedure Note    DATE: 1/8/2022    : Michoacano Tejada MD, MPH    PROCEDURES PERFORMED:  1. Left heart catheterization  2. Coronary angiography  3. Instantaneous wave-free ratio assessment of the mid left anterior descending coronary artery  4. Moderate conscious sedation    INDICATIONS:  Reduced LVEF with cor CTA showing a 70% mid LAD stenosis    CONSENT:  The complete alternatives, risks, and benefits of the procedure were explained to the patient.  Signed informed consent was obtained and placed in the chart prior to the procedure.  A timeout was performed prior to beginning procedure.    MEDICATIONS:  1. Lidocaine  2. Fentanyl  3. Midazolam  4. Nitroglycerin  5. Verapamil  6. Heparin    MODERATE CONSCIOUS SEDATION:  I personally supervised the administration of moderate conscious sedation by the nursing staff for 19 minutes.  Sedation start time 10:21am  Sedation end time 10:40am    CONTRAST: Omnipaque 70 cc    Radiation dose (Air Kerma): 253 mGy    Fluoroscopy time: 6 minutes    ACCESS: 6-Georgian Glidesheath in the right radial artery.    ESTIMATED BLOOD LOSS: 10 cc    COMPLICATIONS: None apparent    PROCEDURE IN DETAIL:  The patient was brought to the cardiac catheterization laboratory in the fasting state.  The skin over the right wrist was prepped and draped in the usual sterile fashion. Lidocaine infiltration was used to anesthetize the tissue over the right radial artery.  Using the micropuncture technique, a 6-Georgian Glidesheath was inserted in the right radial artery.  A 5-Georgian Terumo Tiger catheter was then advanced over a standard J-wire into the left ventricular cavity where it was gently aspirated, flushed, and then withdrawn across the aortic valve with sequential pressures measured.  This catheter was then used to engage the ostium of the left and right coronary arteries and cineangiograms were obtained in multiple projections for complete evaluation of the  "left and right coronary systems.  The we exchanged the Tiger catheter with a 6-Fr EBU 3.75 guide and performed the iFR measurement.    A 0.014\" Omni wire was then advanced into the proximal left anterior desceding coronary artery where pressures were normalized.   The wire was then advanced across the lesion in the mid LAD and IFR was performed with a minimum value of 0.92.  The wire was then withdrawn into the left main coronary artery with equalization of pressures to 1.  One final cineangiogram was then obtained, which demonstrated no evidence of wire perforation thrombus or dissection.  At the completion of the case, all wires, catheters, and sheaths were removed.  A TR band was placed using the patent hemostasis technique.    HEMODYNAMICS:   1. Aortic pressure: 87/60 mmHg  2. LVEDP: 14 mmHg  3. No significant aortic gradient on pullback    CORONARY ANGIOGRAPHY:  1. The left main coronary artery: normal appearing, bifurcates to an LAD and L-Cx  2. The left anterior descending coronary artery: large vessel, wraps around the apex, gives off 2 large Diagonal branches, with a 50%-60% stenosis right after D2 take-off, iFR 0.92.  3. The left circumflex coronary artery: large vessel, gives off 2 very small OM1 and OM2 with a large OM3 (has 20% ostial stenosis). True circumflex tapers into the AV groove.  4. The right coronary artery: large vessel, dominant, mild luminal irregularities    INSTANTANEOUS WAVE FREE RATIO:  1. IFR of the mid coronary artery was 0.92    IMPRESSION:  1. Non-obstructive CAD , iFR mid LAD 0.92 suggestive of a non-hemodynamically significant stenosis.  2. Non-ischemic etiology of cardiomyopathy  3. Slightly elevated LVEDP at 14mmHg (normal <= 12 mmHg)    RECOMMENDATIONS:  1. [place] intensity statin  2. Resume Apixaban 5mg PO every 12hours tonight as long no arteriotomy site complications exist  3. GDMT for cardiomyopathy and HF with close Cardiology follow up  4. Lifestyle modifications to " reduced ASCVD risk  5. Cardiac rehab referral    NOTIFICATION:  The patient's son (Marv) was notified of the results of over the phone.    Referring provider was notified of the findings listed above.    Michoacano Tejada MD, MPH  Interventional Cardiology  [facility] [place]

## 2022-01-14 ENCOUNTER — OFFICE VISIT (OUTPATIENT)
Dept: CARDIOLOGY | Facility: MEDICAL CENTER | Age: 67
End: 2022-01-14
Payer: MEDICARE

## 2022-01-14 VITALS
WEIGHT: 177 LBS | HEART RATE: 86 BPM | HEIGHT: 70 IN | BODY MASS INDEX: 25.34 KG/M2 | SYSTOLIC BLOOD PRESSURE: 84 MMHG | RESPIRATION RATE: 16 BRPM | DIASTOLIC BLOOD PRESSURE: 68 MMHG | OXYGEN SATURATION: 92 %

## 2022-01-14 DIAGNOSIS — E78.5 DYSLIPIDEMIA: ICD-10-CM

## 2022-01-14 DIAGNOSIS — I50.22 CHRONIC SYSTOLIC CONGESTIVE HEART FAILURE, NYHA CLASS 3 (HCC): ICD-10-CM

## 2022-01-14 DIAGNOSIS — I50.20 ACC/AHA STAGE C SYSTOLIC HEART FAILURE (HCC): ICD-10-CM

## 2022-01-14 DIAGNOSIS — I50.21 ACUTE SYSTOLIC HEART FAILURE (HCC): ICD-10-CM

## 2022-01-14 DIAGNOSIS — I25.10 STENOSIS OF LEFT ANTERIOR DESCENDING (LAD) ARTERY: ICD-10-CM

## 2022-01-14 DIAGNOSIS — I48.91 ATRIAL FIBRILLATION WITH RAPID VENTRICULAR RESPONSE (HCC): ICD-10-CM

## 2022-01-14 DIAGNOSIS — I10 PRIMARY HYPERTENSION: ICD-10-CM

## 2022-01-14 DIAGNOSIS — Z79.899 HIGH RISK MEDICATION USE: ICD-10-CM

## 2022-01-14 DIAGNOSIS — N28.9 RENAL INSUFFICIENCY: ICD-10-CM

## 2022-01-14 PROCEDURE — 99215 OFFICE O/P EST HI 40 MIN: CPT | Performed by: NURSE PRACTITIONER

## 2022-01-14 RX ORDER — SPIRONOLACTONE 25 MG/1
25 TABLET ORAL DAILY
Qty: 30 TABLET | Refills: 11 | Status: SHIPPED | OUTPATIENT
Start: 2022-01-14 | End: 2023-01-20

## 2022-01-14 RX ORDER — TORSEMIDE 20 MG/1
20 TABLET ORAL 2 TIMES DAILY
Qty: 60 TABLET | Refills: 11 | Status: SHIPPED | OUTPATIENT
Start: 2022-01-14 | End: 2022-04-22 | Stop reason: SDUPTHER

## 2022-01-14 RX ORDER — ROSUVASTATIN CALCIUM 10 MG/1
20 TABLET, COATED ORAL DAILY
Qty: 60 TABLET | Refills: 11 | Status: SHIPPED | OUTPATIENT
Start: 2022-01-14 | End: 2023-02-10

## 2022-01-14 RX ORDER — LOSARTAN POTASSIUM 25 MG/1
12.5 TABLET ORAL DAILY
Qty: 30 TABLET | Refills: 11 | Status: SHIPPED | OUTPATIENT
Start: 2022-01-14 | End: 2022-03-21

## 2022-01-14 RX ORDER — METOPROLOL SUCCINATE 50 MG/1
50 TABLET, EXTENDED RELEASE ORAL DAILY
Qty: 30 TABLET | Refills: 11 | Status: SHIPPED | OUTPATIENT
Start: 2022-01-14 | End: 2023-01-20

## 2022-01-14 ASSESSMENT — ENCOUNTER SYMPTOMS
TINGLING: 0
FEVER: 0
ORTHOPNEA: 0
BLOOD IN STOOL: 0
SHORTNESS OF BREATH: 0
LOSS OF CONSCIOUSNESS: 0
VOMITING: 0
WEIGHT LOSS: 0
MYALGIAS: 0
FALLS: 0
PND: 0
ABDOMINAL PAIN: 0
BLURRED VISION: 0
CLAUDICATION: 0
PALPITATIONS: 0
COUGH: 0
DIZZINESS: 0
NAUSEA: 0
BRUISES/BLEEDS EASILY: 0

## 2022-01-14 ASSESSMENT — MINNESOTA LIVING WITH HEART FAILURE QUESTIONNAIRE (MLHF)
MAKING YOU STAY IN A HOSPITAL: 5
COSTING YOU MONEY FOR MEDICAL CARE: 5
DIFFICULTY WORKING TO EARN A LIVING: 2
EATING LESS FOODS YOU LIKE: 5
LOSS OF SELF CONTROL IN YOUR LIFE: 2
FEELING LIKE A BURDEN TO FAMILY AND FRIENDS: 2
TIRED, FATIGUED OR LOW ON ENERGY: 5
GIVING YOU SIDE EFFECTS FROM TREATMENTS: 5
DIFFICULTY TO CONCENTRATE OR REMEMBERING THINGS: 2
TOTAL_SCORE: 72
DIFFICULTY SOCIALIZING WITH FAMILY OR FRIENDS: 2
MAKING YOU WORRY: 3
DIFFICULTY GOING AWAY FROM HOME: 2
HAVING TO SIT OR LIE DOWN DURING THE DAY: 5
WORKING AROUND THE HOUSE OR YARD DIFFICULT: 3
MAKING YOU FEEL DEPRESSED: 2
SWELLING IN ANKLES OR LEGS: 5
DIFFICULTY SLEEPING WELL AT NIGHT: 5
WALKING ABOUT OR CLIMBING STAIRS DIFFICULT: 3
DIFFICULTY WITH SEXUAL ACTIVITIES: 2
MAKING YOU SHORT OF BREATH: 5
DIFFICULTY WITH RECREATIONAL PASTIMES, SPORTS, HOBBIES: 2

## 2022-01-14 ASSESSMENT — FIBROSIS 4 INDEX: FIB4 SCORE: 1.33

## 2022-01-14 NOTE — PROGRESS NOTES
Chief Complaint   Patient presents with   • Atrial Fibrillation     F/V Dx: Atrial fibrillation with rapid ventricular response (HCC)   • CHF (Systolic)     F/V Dx: Acute systolic heart failure (HCC)   • Dyslipidemia       Subjective     Miah Oneill is a 66 y.o. male who presents today as heart failure new follow-up after recent hospitalization on 1/3/2022 for A. fib with RVR acute decompensated heart failure EF 20%.    In addition to above patient with past medical history of retinal occlusion. Patient reports he was previously treated for COVID-pneumonia approximately 11/2021 by his primary care provider.    Today, patient feels well, denies chest pain, shortness of breath, palpitations, dizziness/lightheadedness, orthopnea, PND or Edema. Patient reports epistaxis with supplemental oxygen use with prolonged bleeding time which has improved since stopping supplemental oxygen. Patient reports his edema is continually improving. His weights at home have been stable at 174 pounds. Blood pressures have ranged from 100-1 tens/70s to 80s. Patient reports he stopped supplemental oxygen and has been monitoring his oxygen levels. All documented levels have been greater than 90%; discussed patient may discontinue supplemental oxygen if desired and oxygen levels maintained greater than 90%. Discussed no morbidity and mortality benefit to supplemental oxygen.    Based on physical examination findings, patient is euvolemic. No JVD, lungs are clear to auscultation, no pitting edema in bilateral lower extremities, no ascites. Dry weight is 174 lbs.    Patient appears regular rhythm in office today. We will decrease his torsemide and increase his spironolactone. Discussed the importance of optimizing GDMT as tolerated with follow-up echocardiogram in March. Patient agreeable.    History reviewed. No pertinent past medical history.  History reviewed. No pertinent surgical history.  History reviewed. No pertinent  family history.  Social History     Socioeconomic History   • Marital status:      Spouse name: Not on file   • Number of children: Not on file   • Years of education: Not on file   • Highest education level: Not on file   Occupational History   • Not on file   Tobacco Use   • Smoking status: Former Smoker     Quit date: 2000     Years since quittin.0   • Smokeless tobacco: Never Used   Substance and Sexual Activity   • Alcohol use: Yes     Comment: occ   • Drug use: Not Currently   • Sexual activity: Not on file   Other Topics Concern   • Not on file   Social History Narrative   • Not on file     Social Determinants of Health     Financial Resource Strain:    • Difficulty of Paying Living Expenses: Not on file   Food Insecurity:    • Worried About Running Out of Food in the Last Year: Not on file   • Ran Out of Food in the Last Year: Not on file   Transportation Needs:    • Lack of Transportation (Medical): Not on file   • Lack of Transportation (Non-Medical): Not on file   Physical Activity:    • Days of Exercise per Week: Not on file   • Minutes of Exercise per Session: Not on file   Stress:    • Feeling of Stress : Not on file   Social Connections:    • Frequency of Communication with Friends and Family: Not on file   • Frequency of Social Gatherings with Friends and Family: Not on file   • Attends Christianity Services: Not on file   • Active Member of Clubs or Organizations: Not on file   • Attends Club or Organization Meetings: Not on file   • Marital Status: Not on file   Intimate Partner Violence:    • Fear of Current or Ex-Partner: Not on file   • Emotionally Abused: Not on file   • Physically Abused: Not on file   • Sexually Abused: Not on file   Housing Stability:    • Unable to Pay for Housing in the Last Year: Not on file   • Number of Places Lived in the Last Year: Not on file   • Unstable Housing in the Last Year: Not on file     No Known Allergies  Outpatient Encounter Medications as of  1/14/2022   Medication Sig Dispense Refill   • apixaban (ELIQUIS) 5mg Tab Take 1 Tablet by mouth 2 times a day. Indications: Thromboembolism secondary to Atrial Fibrillation 60 Tablet 11   • losartan (COZAAR) 25 MG Tab Take 1/2 Tablet by mouth every day. 30 Tablet 11   • metoprolol SR (TOPROL XL) 50 MG TABLET SR 24 HR Take 1 Tablet by mouth every day. 30 Tablet 11   • rosuvastatin (CRESTOR) 10 MG Tab Take 2 Tablets by mouth every day. 60 Tablet 11   • spironolactone (ALDACTONE) 25 MG Tab Take 1 Tablet by mouth every day. 30 Tablet 11   • torsemide (DEMADEX) 20 MG Tab Take 1 Tablet by mouth 2 times a day. 60 Tablet 11   • albuterol 108 (90 Base) MCG/ACT Aero Soln inhalation aerosol Inhale 2 Puffs every 6 hours as needed for Shortness of Breath.     • [DISCONTINUED] losartan (COZAAR) 25 MG Tab Take 1/2 Tablet by mouth every day. 30 Tablet 1   • [DISCONTINUED] apixaban (ELIQUIS) 5mg Tab Take 1 Tablet by mouth 2 times a day. Indications: Thromboembolism secondary to Atrial Fibrillation 60 Tablet 1   • [DISCONTINUED] rosuvastatin (CRESTOR) 10 MG Tab Take 2 Tablets by mouth every day. 30 Tablet 11   • [DISCONTINUED] metoprolol SR (TOPROL XL) 50 MG TABLET SR 24 HR Take 1 Tablet by mouth every day. 30 Tablet 1   • [DISCONTINUED] spironolactone (ALDACTONE) 25 MG Tab Take 1/2 Tablet by mouth every day. 30 Tablet 3   • [DISCONTINUED] torsemide (DEMADEX) 20 MG Tab Take 2 Tablets by mouth 2 times a day. 120 Tablet 3     No facility-administered encounter medications on file as of 1/14/2022.     Review of Systems   Constitutional: Negative for fever, malaise/fatigue and weight loss.   Eyes: Negative for blurred vision.   Respiratory: Negative for cough and shortness of breath.    Cardiovascular: Negative for chest pain, palpitations, orthopnea, claudication, leg swelling and PND.   Gastrointestinal: Negative for abdominal pain, blood in stool, nausea and vomiting.   Genitourinary: Negative for dysuria, frequency and hematuria.  "  Musculoskeletal: Negative for falls and myalgias.   Neurological: Negative for dizziness, tingling and loss of consciousness.   Endo/Heme/Allergies: Does not bruise/bleed easily.              Objective     BP (!) 84/68 (BP Location: Left arm, Patient Position: Sitting, BP Cuff Size: Adult)   Pulse 86   Resp 16   Ht 1.778 m (5' 10\")   Wt 80.3 kg (177 lb)   SpO2 92%   BMI 25.40 kg/m²     Physical Exam  Vitals reviewed.   Constitutional:       Appearance: He is well-developed.   HENT:      Head: Normocephalic and atraumatic.   Eyes:      Pupils: Pupils are equal, round, and reactive to light.   Neck:      Vascular: No JVD.   Cardiovascular:      Rate and Rhythm: Normal rate and regular rhythm.      Heart sounds: Normal heart sounds. No murmur heard.  No friction rub. No gallop.    Pulmonary:      Effort: Pulmonary effort is normal. No respiratory distress.      Breath sounds: Normal breath sounds.   Abdominal:      General: Bowel sounds are normal. There is no distension.      Palpations: Abdomen is soft.   Musculoskeletal:      Right lower leg: No edema.      Left lower leg: No edema.   Skin:     General: Skin is warm and dry.      Findings: No erythema.   Neurological:      General: No focal deficit present.      Mental Status: He is alert and oriented to person, place, and time. Mental status is at baseline.   Psychiatric:         Mood and Affect: Mood normal.         Behavior: Behavior normal.            Lab Results   Component Value Date/Time    CHOLSTRLTOT 114 01/08/2022 04:51 AM    LDL 57 01/08/2022 04:51 AM    HDL 40 01/08/2022 04:51 AM    TRIGLYCERIDE 86 01/08/2022 04:51 AM       Lab Results   Component Value Date/Time    SODIUM 139 01/08/2022 04:51 AM    POTASSIUM 4.3 01/08/2022 04:51 AM    CHLORIDE 100 01/08/2022 04:51 AM    CO2 27 01/08/2022 04:51 AM    GLUCOSE 103 (H) 01/08/2022 04:51 AM    BUN 42 (H) 01/08/2022 04:51 AM    CREATININE 1.68 (H) 01/08/2022 04:51 AM     Lab Results   Component Value " Date/Time    ALKPHOSPHAT 117 (H) 01/03/2022 04:00 PM    ASTSGOT 41 01/03/2022 04:00 PM    ALTSGPT 49 01/03/2022 04:00 PM    TBILIRUBIN 1.2 01/03/2022 04:00 PM      Transthoracic echocardiogram (1/4/22):     No prior study is available for comparison.   The left ventricular ejection fraction is visually estimated to be 20%.  Grade II diastolic dysfunction.  The right ventricle is dilated.  Reduced right ventricular systolic function.  Normal inferior vena cava size and inspiratory collapse.  Moderate mitral regurgitation.  Moderate tricuspid regurgitation.  Estimated right ventricular systolic pressure is 48  mmHg.    [facility] (1/8/2022):  HEMODYNAMICS:   1. Aortic pressure: 87/60 mmHg  2. LVEDP: 14 mmHg  3. No significant aortic gradient on pullback     CORONARY ANGIOGRAPHY:  1. The left main coronary artery: normal appearing, bifurcates to an LAD and L-Cx  2. The left anterior descending coronary artery: large vessel, wraps around the apex, gives off 2 large Diagonal branches, with a 50%-60% stenosis right after D2 take-off, iFR 0.92.  3. The left circumflex coronary artery: large vessel, gives off 2 very small OM1 and OM2 with a large OM3 (has 20% ostial stenosis). True circumflex tapers into the AV groove.  4. The right coronary artery: large vessel, dominant, mild luminal irregularities     INSTANTANEOUS WAVE FREE RATIO:  1. IFR of the mid coronary artery was 0.92     IMPRESSION:  1. Non-obstructive CAD , iFR mid LAD 0.92 suggestive of a non-hemodynamically significant stenosis.  2. Non-ischemic etiology of cardiomyopathy  3. Slightly elevated LVEDP at 14mmHg (normal <= 12 mmHg)     RECOMMENDATIONS:  1. [place] intensity statin  2. Resume Apixaban 5mg PO every 12hours tonight as long no arteriotomy site complications exist  3. GDMT for cardiomyopathy and HF with close Cardiology follow up  4. Lifestyle modifications to reduced ASCVD risk  5. Cardiac rehab referral      Assessment & Plan     1. Atrial fibrillation with  rapid ventricular response (HCC)  apixaban (ELIQUIS) 5mg Tab    Basic Metabolic Panel    EC-ECHOCARDIOGRAM COMPLETE W/O CONT   2. Acute systolic heart failure (HCC)  losartan (COZAAR) 25 MG Tab    metoprolol SR (TOPROL XL) 50 MG TABLET SR 24 HR    rosuvastatin (CRESTOR) 10 MG Tab    spironolactone (ALDACTONE) 25 MG Tab    torsemide (DEMADEX) 20 MG Tab    Basic Metabolic Panel    EC-ECHOCARDIOGRAM COMPLETE W/O CONT   3. Primary hypertension  Basic Metabolic Panel    EC-ECHOCARDIOGRAM COMPLETE W/O CONT   4. ACC/AHA stage C systolic heart failure (HCC)  Basic Metabolic Panel    EC-ECHOCARDIOGRAM COMPLETE W/O CONT   5. High risk medication use  Basic Metabolic Panel    EC-ECHOCARDIOGRAM COMPLETE W/O CONT   6. Chronic systolic congestive heart failure, NYHA class 3 (HCC)  Basic Metabolic Panel    EC-ECHOCARDIOGRAM COMPLETE W/O CONT   7. Dyslipidemia     8. Stenosis of left anterior descending (LAD) artery     9. Renal insufficiency         Medical Decision Making: Today's Assessment/Status/Plan:        Paroximal Atrial fibrillation with Hx RVR  - S/P successful SABRINA guided DCCV 1/5/2022.  Remains in sinus rhythm in office today.  -Continue Metoprolol XL 50 mg daily  - [id number] at least 2, apixaban 5 mg twice daily  -Discussed EP referral as needed for return to A. Fib; patient agreeable.    Heart failure with reduced EF suspect secondary to tachycardia induced cardiomyopathy versus viral cardiomyopathy; Stage C, Class II, LVEF 20%:  -Ischemic cardiomyopathy ruled out  -ACE-I/ARB/ARNI: Continue losartan 12.5 mg daily  -Evidence Based Beta-blocker:  Continue metoprolol XL 50  -Aldosterone Antagonist:  Increase spironolactone 25 mg daily  -Diuretic:  Decrease torsemide 20 mg BID  -SGLT2i:  Discussed in office today, patient would like to consider in the future.   -Patient is not a candidate for ICD placement at this time due to < 3 months GDMT  -Labs:  BMP in 2 weeks  -Reinforced s/sx of worsening heart failure with  patient and weight monitoring. Pt verbalizes understanding. Pt to call office if present.    -Heart Failure Education: Reviewed heart failure booklet in office to today with patient answered questions. Pt to be contacted by HF nurse for further education.      Nonobstructive coronary artery disease  -Atorvastatin 80 mg daily  -Beta-blocker per above  -No ASA due to OAC    Renal dysfunction, unclear baseline  -Creatinine 1.68  -CTM  -BMP 2 weeks     Retinal Occlusion, remote  - cont statin    FU in clinic in 4 weeks. Sooner if needed.    Patient verbalizes understanding and agrees with the plan of care.     Collaborating MD: Dr. Wesley MD    I personally spent a total of 48 minutes which includes face-to-face time and non-face-to-face time spent on preparing to see the patient, reviewing prior notes and tests, obtaining history from the patient, performing a medically appropriate exam, counseling and educating the patient, ordering medications/tests/procedures/referrals as clinically indicated,and documenting information in the electronic medical record.    PLEASE NOTE: This Note was created using voice recognition Software. I have made every reasonable attempt to correct obvious errors, but I expect that there are errors of grammar and possibly content that I did not discover before finalizing the note

## 2022-01-18 ENCOUNTER — TELEPHONE (OUTPATIENT)
Dept: CARDIOLOGY | Facility: MEDICAL CENTER | Age: 67
End: 2022-01-18

## 2022-01-18 NOTE — TELEPHONE ENCOUNTER
PAULINA Hubbard,      This patient called and wanted to speak to someone about two things. First, his last appt with PAULINA she told him to follow up 4 weeks later, but while in the office they scheduled him about 8 weeks out and after his echo. Does PAULINA still want to see him in 4 weeks instead? Second, his script for Eliquis he stated that [facility] in [place] stated it would be about $500 to fill monthly. Can you please call him back on these at [phone].      Thank you,    SHIVA

## 2022-01-18 NOTE — TELEPHONE ENCOUNTER
Lm that I will find out from PAULINA when she wants to see him again.  Also to check with his pharmacy on alternative anticoagulants.  Mary Kay Harding to give doses for Xarelto and Pradaxa if appropriate for patient so we can check on cost.

## 2022-01-19 ENCOUNTER — TELEPHONE (OUTPATIENT)
Dept: CARDIOLOGY | Facility: MEDICAL CENTER | Age: 67
End: 2022-01-19

## 2022-01-19 ASSESSMENT — NEW YORK HEART ASSOCIATION (NYHA) CLASSIFICATION: NYHA FUNCTIONAL CLASS: CLASS III

## 2022-01-20 NOTE — TELEPHONE ENCOUNTER
"Other (QUESTIONS)  SHARAN Ball R.N. Yesterday (2:46 PM)       Yes. I would like to see him in 4 weeks. Xarelto 20 mg daily is acceptable alternative or coumadin per anticoagulation clinic. Thank you    Message text      Returned pt call and reviewed APRN recommendations.  He states he went to [facility] and Eliquis 30 days worth $50 vs [facility] and same rx over $560 30 day but pt wasn't sure if that was the total for all the medications sent or Eliqiuis alone for 30 days.  Miah states he doesn't have any rx coverage with insurance.  Encourage pt to look up medications via [employer] for pricing.      Reviewed FV availability and pt was told the next available opening was 2/23.  He notes echo and FV is scheduled 3/21.  Miah states he \"will have panel completed next week.\"    Reassurance given findings will be relayed to APRN for clarification in regards to FV.  He states no other concerns or questions at this time.  Miah is appreciative of information given.    Findings relayed to APRN for advise and her primary RN to follow up.  "

## 2022-01-24 NOTE — TELEPHONE ENCOUNTER
MIESHA Ball.  You 53 minutes ago (10:13 AM)         Yes. Patient to keep FV as scheduled          Chart review shows F/U scheduled for same day as Echo 3/21/22

## 2022-01-28 DIAGNOSIS — I50.22 CHRONIC SYSTOLIC CONGESTIVE HEART FAILURE, NYHA CLASS 3 (HCC): ICD-10-CM

## 2022-01-28 DIAGNOSIS — Z79.899 HIGH RISK MEDICATION USE: ICD-10-CM

## 2022-01-28 DIAGNOSIS — I50.20 ACC/AHA STAGE C SYSTOLIC HEART FAILURE (HCC): ICD-10-CM

## 2022-01-28 DIAGNOSIS — I50.21 ACUTE SYSTOLIC HEART FAILURE (HCC): ICD-10-CM

## 2022-01-28 DIAGNOSIS — I10 PRIMARY HYPERTENSION: ICD-10-CM

## 2022-01-28 DIAGNOSIS — I48.91 ATRIAL FIBRILLATION WITH RAPID VENTRICULAR RESPONSE (HCC): ICD-10-CM

## 2022-02-07 ENCOUNTER — TELEPHONE (OUTPATIENT)
Dept: CARDIOLOGY | Facility: MEDICAL CENTER | Age: 67
End: 2022-02-07

## 2022-02-07 DIAGNOSIS — I48.91 ATRIAL FIBRILLATION WITH RAPID VENTRICULAR RESPONSE (HCC): ICD-10-CM

## 2022-02-07 NOTE — TELEPHONE ENCOUNTER
PAULINA Boles from [facility], would like a call, he has questions on refill:    apixaban (ELIQUIS) 5mg Tab [[id number]]    [facility] - [phone]    Thank you,   Linda COX

## 2022-02-08 NOTE — TELEPHONE ENCOUNTER
S/W pharmacy- Pt has no Part D plan- and Eliquis is $550 for a 30 day supply. Patient is not on OAC at this time.     Attempted to call patient- [facility] has program $80/month- can get Xarelto with 90 day supply at a time.     Will see if patient is open to this. Pt lives in [place] therefore INR monitoring may be difficult. Xarelto without Part D will still be too expensive.

## 2022-02-09 ENCOUNTER — TELEPHONE (OUTPATIENT)
Dept: VASCULAR LAB | Facility: MEDICAL CENTER | Age: 67
End: 2022-02-09
Payer: MEDICARE

## 2022-02-09 NOTE — TELEPHONE ENCOUNTER
[facility]    Received anticoag referral for warfarin due to AF from VICKIE Oro on 2/8/22    Called pt regarding the above referral - pt states that he is currently on Eliquis. He has no Medicare Part D coverage at this time - he is actively working w/ his ins to obtain part D coverage. He is also investigating assistance through the  as well.    Pt request c/b in a week to reassess to see if he will need to transition to warfarin.    Pt currently has a 30 day supply of Eliquis on hand as of today.    Insurance: Medicare  PCP: None  Locations to be seen: [address] or Lab/Telephone    [facility] at [phone], fax [phone]    Hugo Romano, MattieD, BCACP

## 2022-02-09 NOTE — TELEPHONE ENCOUNTER
S/W pt, aware of calling both Eliquis and Xarelto manufactures for pt assistance.     Just picked up 30 day supply of Eliquis    He will contact both and see which he qualifies the best for. Also made aware of coumadin process. Referring to pharmacotherapy to assist with INR monitoring in [place].

## 2022-02-16 NOTE — TELEPHONE ENCOUNTER
Called pt to f/u once more - pt states he is waiting on  determination on PAP.     Pt requests c/b in 1 more week to re-assess.    Will f/u accordingly.    Hugo Romano PharmD, BCACP

## 2022-02-23 ENCOUNTER — TELEPHONE (OUTPATIENT)
Dept: VASCULAR LAB | Facility: MEDICAL CENTER | Age: 67
End: 2022-02-23
Payer: MEDICARE

## 2022-02-23 NOTE — TELEPHONE ENCOUNTER
[facility]     Received anticoag referral for warfarin due to AF from VICKIE Oro on 2/8/22     Called pt regarding the above referral - pt states that he is currently on Eliquis. He has no Medicare Part D coverage at this time - he is actively working w/ his ins to obtain part D coverage. He is also investigating assistance through the  as well.     Called and s/w pt regarding the above. Pt states he just sent in his income verification to the  and expects to hear back soon. Requests c/b in another week to re-assess.     Pt currently has a 2 week supply of Eliquis on hand as of today.     Insurance: Medicare  PCP: None  Locations to be seen: [address] or Lab/Telephone     [facility] at [phone], fax [phone]     Hugo Romano, MattieD, BCACP

## 2022-03-21 ENCOUNTER — OFFICE VISIT (OUTPATIENT)
Dept: CARDIOLOGY | Facility: MEDICAL CENTER | Age: 67
End: 2022-03-21
Payer: MEDICARE

## 2022-03-21 ENCOUNTER — HOSPITAL ENCOUNTER (OUTPATIENT)
Dept: CARDIOLOGY | Facility: MEDICAL CENTER | Age: 67
End: 2022-03-21
Attending: NURSE PRACTITIONER
Payer: MEDICARE

## 2022-03-21 ENCOUNTER — ANTICOAGULATION VISIT (OUTPATIENT)
Dept: VASCULAR LAB | Facility: MEDICAL CENTER | Age: 67
End: 2022-03-21
Attending: INTERNAL MEDICINE
Payer: MEDICARE

## 2022-03-21 VITALS
HEIGHT: 70 IN | WEIGHT: 185 LBS | HEART RATE: 75 BPM | RESPIRATION RATE: 16 BRPM | DIASTOLIC BLOOD PRESSURE: 64 MMHG | BODY MASS INDEX: 26.48 KG/M2 | OXYGEN SATURATION: 96 % | SYSTOLIC BLOOD PRESSURE: 112 MMHG

## 2022-03-21 DIAGNOSIS — I10 PRIMARY HYPERTENSION: ICD-10-CM

## 2022-03-21 DIAGNOSIS — I50.21 ACUTE SYSTOLIC HEART FAILURE (HCC): ICD-10-CM

## 2022-03-21 DIAGNOSIS — I25.10 STENOSIS OF LEFT ANTERIOR DESCENDING (LAD) ARTERY: ICD-10-CM

## 2022-03-21 DIAGNOSIS — I42.8 NON-ISCHEMIC CARDIOMYOPATHY (HCC): ICD-10-CM

## 2022-03-21 DIAGNOSIS — I48.91 ATRIAL FIBRILLATION WITH RAPID VENTRICULAR RESPONSE (HCC): ICD-10-CM

## 2022-03-21 DIAGNOSIS — Z79.899 HIGH RISK MEDICATION USE: ICD-10-CM

## 2022-03-21 DIAGNOSIS — E78.5 DYSLIPIDEMIA: ICD-10-CM

## 2022-03-21 DIAGNOSIS — I50.20 ACC/AHA STAGE C SYSTOLIC HEART FAILURE (HCC): ICD-10-CM

## 2022-03-21 DIAGNOSIS — I50.22 CHRONIC SYSTOLIC CONGESTIVE HEART FAILURE, NYHA CLASS 3 (HCC): ICD-10-CM

## 2022-03-21 LAB
LV EJECT FRACT  99904: 40
LV EJECT FRACT MOD 2C 99903: 37.4
LV EJECT FRACT MOD 4C 99902: 39.71
LV EJECT FRACT MOD BP 99901: 40.16

## 2022-03-21 PROCEDURE — 99213 OFFICE O/P EST LOW 20 MIN: CPT

## 2022-03-21 PROCEDURE — 93306 TTE W/DOPPLER COMPLETE: CPT | Mod: 26 | Performed by: INTERNAL MEDICINE

## 2022-03-21 PROCEDURE — 93306 TTE W/DOPPLER COMPLETE: CPT

## 2022-03-21 PROCEDURE — 99214 OFFICE O/P EST MOD 30 MIN: CPT | Performed by: NURSE PRACTITIONER

## 2022-03-21 RX ORDER — SACUBITRIL AND VALSARTAN 24; 26 MG/1; MG/1
1 TABLET, FILM COATED ORAL 2 TIMES DAILY
Qty: 60 TABLET | Refills: 11 | Status: SHIPPED | OUTPATIENT
Start: 2022-03-21 | End: 2022-04-22

## 2022-03-21 RX ORDER — SACUBITRIL AND VALSARTAN 24; 26 MG/1; MG/1
1 TABLET, FILM COATED ORAL 2 TIMES DAILY
Qty: 60 TABLET | Refills: 0 | COMMUNITY
Start: 2022-03-21 | End: 2022-04-22

## 2022-03-21 ASSESSMENT — ENCOUNTER SYMPTOMS
VOMITING: 0
FALLS: 0
ORTHOPNEA: 0
BLOOD IN STOOL: 0
MYALGIAS: 0
BLURRED VISION: 0
SHORTNESS OF BREATH: 0
BRUISES/BLEEDS EASILY: 0
PALPITATIONS: 0
DIZZINESS: 0
PND: 0
CLAUDICATION: 0
NAUSEA: 0
ABDOMINAL PAIN: 0
TINGLING: 0
COUGH: 0
LOSS OF CONSCIOUSNESS: 0
WEIGHT LOSS: 0
FEVER: 0

## 2022-03-21 ASSESSMENT — FIBROSIS 4 INDEX: FIB4 SCORE: 1.33

## 2022-03-21 NOTE — PROGRESS NOTES
NEW DOAC   .  Anticoagulation Summary  As of 3/21/2022    INR goal:     TTR:  --   INR used for dosing:  No new INR was available at the time of this encounter.   Warfarin maintenance plan:  No maintenance plan   Next INR check:  4/20/2022   Target end date:           Anticoagulation Episode Summary     INR check location:      Preferred lab:      Send INR reminders to:      Comments:          Anticoagulation Patient Findings  Patient Findings     Negatives:  Signs/symptoms of thrombosis, Signs/symptoms of bleeding, Laboratory test error suspected, Change in health, Change in alcohol use, Change in activity, Upcoming invasive procedure, Emergency department visit, Upcoming dental procedure, Missed doses, Extra doses, Change in medications, Change in diet/appetite, Hospital admission, Bruising, Other complaints          PCP: Pcp Pt States None  Cardiologist: Mary Kay JOHNSTON/Dr Porter    Dx: Atrial fibrillation    CHADSVASC = 2 (chf, age)  HAS-BLED = 1 (age)  Target End Date = tbd per cardiology    Pt Hx: In January, pt was admitted for cardiomyopathy induced atrial fibrillation and was started on Eliquis.    Labs:  Lab Results   Component Value Date/Time    WBC 13.4 (H) 01/05/2022 03:02 AM    RBC 4.97 01/05/2022 03:02 AM    HEMOGLOBIN 14.7 01/05/2022 03:02 AM    HEMATOCRIT 45.4 01/05/2022 03:02 AM    MCV 91.3 01/05/2022 03:02 AM    MCH 29.6 01/05/2022 03:02 AM    MCHC 32.4 (L) 01/05/2022 03:02 AM    MPV 11.8 01/05/2022 03:02 AM    NEUTSPOLYS 80.60 (H) 01/05/2022 03:02 AM    LYMPHOCYTES 10.80 (L) 01/05/2022 03:02 AM    MONOCYTES 7.00 01/05/2022 03:02 AM    EOSINOPHILS 0.10 01/05/2022 03:02 AM    BASOPHILS 0.60 01/05/2022 03:02 AM      Lab Results   Component Value Date/Time    SODIUM 139 01/08/2022 04:51 AM    POTASSIUM 4.3 01/08/2022 04:51 AM    CHLORIDE 100 01/08/2022 04:51 AM    CO2 27 01/08/2022 04:51 AM    GLUCOSE 103 (H) 01/08/2022 04:51 AM    BUN 42 (H) 01/08/2022 04:51 AM    CREATININE 1.68 (H) 01/08/2022  04:51 AM          Pt is new to Eliquis and new to [facility]. Discussed:   · Indication for DOAC therapy.  · Importance of monitoring and compliance.   · Monitoring parameters, signs and symptoms of bleeding or clotting.  · DOAC therapy, side effects, potential DDIs, OTC medications  · Lifestyle safety, ie smoking, ETOH, hobby safety, fall safety/prevention  · Procedures for missed doses or suspected missed doses, surgeries/procedures, travel, dental work, any medication changes    DOAC affordable = no - pending Medicare Part D coverage; will help pt sign up for PAP with [facility]. Pt to return form to me to get this submitted.    Samples provided: yes 4 weeks worth    F/U - 4 weeks    Claudia Frazier, PharmD      Added [facility] to care team   Send to [place]

## 2022-03-21 NOTE — PROGRESS NOTES
Chief Complaint   Patient presents with   • Atrial Fibrillation       Subjective     Miah Oneill is a 66 y.o. male who presents today as heart failure follow-up after recent hospitalization on 1/3/2022 for A. fib with RVR acute decompensated heart failure EF 20%.  Patient was last seen by myself on 1/14/2022.    In addition to above patient with past medical history of retinal occlusion. Patient reports he was previously treated for COVID-pneumonia approximately 11/2021 by his primary care provider.    Today, patient reports he continues to feel improved with increased activity tolerance.  He will soon resume exercise with the warmer weather. Patient denies chest pain, shortness of breath, palpitations, dizziness/lightheadedness, orthopnea, PND or Edema.  Patient denies feeling irregular heart rhythm or tachycardia.  Patient reports his home blood pressure has been ranging systolically from 110s-130s.  His weight has been stable at 174 to 176 pounds.  Today, based on physical examination findings, patient is euvolemic. No JVD, lungs are clear to auscultation, no pitting edema in bilateral lower extremities, no ascites. Dry weight is 174 lbs.    We discussed patient's echocardiogram results today in office as described below.    We will continue to optimize his HF OMT with transitioning to Entresto if no barriers.  Otherwise, patient agreeable to decrease torsemide to daily and increase losartan at 25 mg daily.  Patient to obtain blood work in 2 weeks for high risk medication use.  Heart failure OMT handout with medication changes provided to patient in office today.  Patient to continue Eliquis for stroke reduction.  Recommend 2-week cardiac event monitor at follow-up with Dr. Porter to determine A. fib burden and further recommendations for OAC.    History reviewed. No pertinent past medical history.  History reviewed. No pertinent surgical history.  History reviewed. No pertinent family  history.  Social History     Socioeconomic History   • Marital status:      Spouse name: Not on file   • Number of children: Not on file   • Years of education: Not on file   • Highest education level: Not on file   Occupational History   • Not on file   Tobacco Use   • Smoking status: Former Smoker     Quit date:      Years since quittin.2   • Smokeless tobacco: Never Used   Substance and Sexual Activity   • Alcohol use: Yes     Comment: occ   • Drug use: Not Currently   • Sexual activity: Not on file   Other Topics Concern   • Not on file   Social History Narrative   • Not on file     Social Determinants of Health     Financial Resource Strain: Not on file   Food Insecurity: Not on file   Transportation Needs: Not on file   Physical Activity: Not on file   Stress: Not on file   Social Connections: Not on file   Intimate Partner Violence: Not on file   Housing Stability: Not on file     No Known Allergies  Outpatient Encounter Medications as of 3/21/2022   Medication Sig Dispense Refill   • sacubitril-valsartan (ENTRESTO) 24-26 MG Tab tablet Take 1 Tablet by mouth 2 times a day. 60 Tablet 11   • sacubitril-valsartan (ENTRESTO) 24-26 MG Tab tablet Take 1 Tablet by mouth 2 times a day. 60 Tablet 0   • apixaban (ELIQUIS) 5mg Tab Take 1 Tablet by mouth 2 times a day. Indications: Thromboembolism secondary to Atrial Fibrillation 60 Tablet 11   • metoprolol SR (TOPROL XL) 50 MG TABLET SR 24 HR Take 1 Tablet by mouth every day. 30 Tablet 11   • rosuvastatin (CRESTOR) 10 MG Tab Take 2 Tablets by mouth every day. 60 Tablet 11   • spironolactone (ALDACTONE) 25 MG Tab Take 1 Tablet by mouth every day. 30 Tablet 11   • torsemide (DEMADEX) 20 MG Tab Take 1 Tablet by mouth 2 times a day. 60 Tablet 11   • albuterol 108 (90 Base) MCG/ACT Aero Soln inhalation aerosol Inhale 2 Puffs every 6 hours as needed for Shortness of Breath.     • [DISCONTINUED] losartan (COZAAR) 25 MG Tab Take 1/2 Tablet by mouth every day. 30  "Tablet 11     No facility-administered encounter medications on file as of 3/21/2022.     Review of Systems   Constitutional: Negative for fever, malaise/fatigue and weight loss.   Eyes: Negative for blurred vision.   Respiratory: Negative for cough and shortness of breath.    Cardiovascular: Negative for chest pain, palpitations, orthopnea, claudication, leg swelling and PND.   Gastrointestinal: Negative for abdominal pain, blood in stool, nausea and vomiting.   Genitourinary: Negative for dysuria, frequency and hematuria.   Musculoskeletal: Negative for falls and myalgias.   Neurological: Negative for dizziness, tingling and loss of consciousness.   Endo/Heme/Allergies: Does not bruise/bleed easily.              Objective     /64 (BP Location: Left arm, Patient Position: Sitting, BP Cuff Size: Adult)   Pulse 75   Resp 16   Ht 1.778 m (5' 10\")   Wt 83.9 kg (185 lb)   SpO2 96%   BMI 26.54 kg/m²     Physical Exam  Vitals reviewed.   Constitutional:       Appearance: He is well-developed.   HENT:      Head: Normocephalic and atraumatic.   Eyes:      Pupils: Pupils are equal, round, and reactive to light.   Neck:      Vascular: No JVD.   Cardiovascular:      Rate and Rhythm: Normal rate and regular rhythm.      Heart sounds: Normal heart sounds. No murmur heard.    No friction rub. No gallop.   Pulmonary:      Effort: Pulmonary effort is normal. No respiratory distress.      Breath sounds: Normal breath sounds.   Abdominal:      General: Bowel sounds are normal. There is no distension.      Palpations: Abdomen is soft.   Musculoskeletal:      Right lower leg: No edema.      Left lower leg: No edema.   Skin:     General: Skin is warm and dry.      Findings: No erythema.   Neurological:      General: No focal deficit present.      Mental Status: He is alert and oriented to person, place, and time. Mental status is at baseline.   Psychiatric:         Mood and Affect: Mood normal.         Behavior: Behavior " normal.            Lab Results   Component Value Date/Time    CHOLSTRLTOT 114 01/08/2022 04:51 AM    LDL 57 01/08/2022 04:51 AM    HDL 40 01/08/2022 04:51 AM    TRIGLYCERIDE 86 01/08/2022 04:51 AM       Lab Results   Component Value Date/Time    SODIUM 139 01/08/2022 04:51 AM    POTASSIUM 4.3 01/08/2022 04:51 AM    CHLORIDE 100 01/08/2022 04:51 AM    CO2 27 01/08/2022 04:51 AM    GLUCOSE 103 (H) 01/08/2022 04:51 AM    BUN 42 (H) 01/08/2022 04:51 AM    CREATININE 1.68 (H) 01/08/2022 04:51 AM     Lab Results   Component Value Date/Time    ALKPHOSPHAT 117 (H) 01/03/2022 04:00 PM    ASTSGOT 41 01/03/2022 04:00 PM    ALTSGPT 49 01/03/2022 04:00 PM    TBILIRUBIN 1.2 01/03/2022 04:00 PM      Transthoracic echocardiogram (1/4/22):     No prior study is available for comparison.   The left ventricular ejection fraction is visually estimated to be 20%.  Grade II diastolic dysfunction.  The right ventricle is dilated.  Reduced right ventricular systolic function.  Normal inferior vena cava size and inspiratory collapse.  Moderate mitral regurgitation.  Moderate tricuspid regurgitation.  Estimated right ventricular systolic pressure is 48  mmHg.    [facility] (1/8/2022):  HEMODYNAMICS:   1. Aortic pressure: 87/60 mmHg  2. LVEDP: 14 mmHg  3. No significant aortic gradient on pullback     CORONARY ANGIOGRAPHY:  1. The left main coronary artery: normal appearing, bifurcates to an LAD and L-Cx  2. The left anterior descending coronary artery: large vessel, wraps around the apex, gives off 2 large Diagonal branches, with a 50%-60% stenosis right after D2 take-off, iFR 0.92.  3. The left circumflex coronary artery: large vessel, gives off 2 very small OM1 and OM2 with a large OM3 (has 20% ostial stenosis). True circumflex tapers into the AV groove.  4. The right coronary artery: large vessel, dominant, mild luminal irregularities     INSTANTANEOUS WAVE FREE RATIO:  1. IFR of the mid coronary artery was  0.92     IMPRESSION:  1. Non-obstructive CAD , iFR mid LAD 0.92 suggestive of a non-hemodynamically significant stenosis.  2. Non-ischemic etiology of cardiomyopathy  3. Slightly elevated LVEDP at 14mmHg (normal <= 12 mmHg)     RECOMMENDATIONS:  1. Destin intensity statin  2. Resume Apixaban 5mg PO every 12hours tonight as long no arteriotomy site complications exist  3. GDMT for cardiomyopathy and HF with close Cardiology follow up  4. Lifestyle modifications to reduced ASCVD risk  5. Cardiac rehab referral    Transthoracic Echo (3/21/2022): CONCLUSIONS  Compared to the prior echo on 1/4/2022, there has been improvement in   left ventricular systolic function.  Mild concentric left ventricular hypertrophy. Moderately reduced left   ventricular systolic function. The left ventricular ejection fraction   is visually estimated to be 40%.   Mild mitral regurgitation.  Assessment & Plan     1. Atrial fibrillation with rapid ventricular response (HCC)  Basic Metabolic Panel   2. Primary hypertension  Basic Metabolic Panel   3. ACC/AHA stage C systolic heart failure (HCC)  Basic Metabolic Panel   4. High risk medication use  Basic Metabolic Panel   5. Chronic systolic congestive heart failure, NYHA class 3 (HCC)  Basic Metabolic Panel       Medical Decision Making: Today's Assessment/Status/Plan:        Paroximal Atrial fibrillation with Hx RVR  - S/P successful SABRINA guided DCCV 1/5/2022.  Remains in sinus rhythm in office today.  -Continue Metoprolol XL 50 mg daily  - [id number] at least 2, apixaban 5 mg twice daily  -Consider cardiac event monitor in future to determine afib burden.    Heart failure with reduced EF suspect secondary to tachycardia induced cardiomyopathy versus viral cardiomyopathy; Stage C, Class II, LVEF 40% (recovered from 20%):  -Ischemic cardiomyopathy ruled out  -ACE-I/ARB/ARNI: Transition to entresto 24/26. If barriers encountered and unable to start, Increase losartan 25 mg daily.   -Evidence  Based Beta-blocker:  Continue metoprolol XL 50  -Aldosterone Antagonist:  Continue spironolactone 25 mg daily  -Diuretic:  Decrease torsemide 20 mg daily  -SGLT2i:  Consider in future  -EF 40%- no indication for primary AICD at this time  -Labs:  BMP in 2 weeks  -Reinforced s/sx of worsening heart failure with patient and weight monitoring. Pt verbalizes understanding. Pt to call office if present.    -Heart Failure Education: Reviewed heart failure booklet in office to today with patient answered questions. Pt to be contacted by HF nurse for further education.      Nonobstructive coronary artery disease  -Atorvastatin 80 mg daily  -Beta-blocker per above  -No ASA due to OAC    Renal dysfunction, unclear baseline  -Creatinine 1.68  -CTM  -BMP 2 weeks     Retinal Occlusion, remote  - cont statin    FU in clinic in 4 weeks. Sooner if needed.    Patient verbalizes understanding and agrees with the plan of care.     I personally spent a total of 32 minutes which includes face-to-face time and non-face-to-face time spent on preparing to see the patient, reviewing prior notes and tests, obtaining history from the patient, performing a medically appropriate exam, counseling and educating the patient, ordering medications/tests/procedures/referrals as clinically indicated,and documenting information in the electronic medical record.    PLEASE NOTE: This Note was created using voice recognition Software. I have made every reasonable attempt to correct obvious errors, but I expect that there are errors of grammar and possibly content that I did not discover before finalizing the note

## 2022-03-21 NOTE — PATIENT INSTRUCTIONS
If entresto is affordible. Stop losartan and start entresto. With blood work in 2 weeks.    Otherwise, continue losartan 25 mg daily if NOT taking entresto

## 2022-03-26 ENCOUNTER — DOCUMENTATION (OUTPATIENT)
Dept: VASCULAR LAB | Facility: MEDICAL CENTER | Age: 67
End: 2022-03-26
Payer: MEDICARE

## 2022-03-27 NOTE — PROGRESS NOTES
Initial anticoag note and most recent cards note reviewed.  Patient with afib and chads vasc = at least 2    Pending further recommendations, we will continue with indefinite anticoagulation  as directed by cards    Will defer all management of rhythm, rate, and other cv issues, aside from anticoagulation, to cards Michael Bloch, MD  Anticoagulation Clinic    Cc:  DARIEL Harding

## 2022-03-31 ENCOUNTER — DOCUMENTATION (OUTPATIENT)
Dept: VASCULAR LAB | Facility: MEDICAL CENTER | Age: 67
End: 2022-03-31
Payer: MEDICARE

## 2022-03-31 NOTE — PROGRESS NOTES
Faxed [facility] [place] Eliquis.  Will send paperwork to be scanned in media tab    Claudia Frazier, MattieD

## 2022-04-14 ENCOUNTER — TELEPHONE (OUTPATIENT)
Dept: CARDIOLOGY | Facility: MEDICAL CENTER | Age: 67
End: 2022-04-14
Payer: MEDICARE

## 2022-04-14 NOTE — TELEPHONE ENCOUNTER
Chart Prep      Spoke to pt regarding standing lab work and he stated that he has not got it done yet due to having a busy schedule but will get it done early next week. Pt was also reminded on their appt date and time.

## 2022-04-19 DIAGNOSIS — I10 PRIMARY HYPERTENSION: ICD-10-CM

## 2022-04-19 DIAGNOSIS — I50.20 ACC/AHA STAGE C SYSTOLIC HEART FAILURE (HCC): ICD-10-CM

## 2022-04-19 DIAGNOSIS — I50.22 CHRONIC SYSTOLIC CONGESTIVE HEART FAILURE, NYHA CLASS 3 (HCC): ICD-10-CM

## 2022-04-19 DIAGNOSIS — Z79.899 HIGH RISK MEDICATION USE: ICD-10-CM

## 2022-04-19 DIAGNOSIS — I48.91 ATRIAL FIBRILLATION WITH RAPID VENTRICULAR RESPONSE (HCC): ICD-10-CM

## 2022-04-21 ENCOUNTER — DOCUMENTATION (OUTPATIENT)
Dept: VASCULAR LAB | Facility: MEDICAL CENTER | Age: 67
End: 2022-04-21
Payer: MEDICARE

## 2022-04-21 NOTE — PROGRESS NOTES
OP Anticoagulation Telephone Note    Date: 4/21/2022       Plan:  Pt no showed for appt. Called to remind patient please reschedule appt. Left VM      Jen Blanchard, Pharm D

## 2022-04-22 ENCOUNTER — NON-PROVIDER VISIT (OUTPATIENT)
Dept: CARDIOLOGY | Facility: MEDICAL CENTER | Age: 67
End: 2022-04-22
Attending: NURSE PRACTITIONER
Payer: MEDICARE

## 2022-04-22 ENCOUNTER — TELEMEDICINE (OUTPATIENT)
Dept: CARDIOLOGY | Facility: MEDICAL CENTER | Age: 67
End: 2022-04-22
Payer: MEDICARE

## 2022-04-22 VITALS
HEART RATE: 71 BPM | SYSTOLIC BLOOD PRESSURE: 127 MMHG | WEIGHT: 178 LBS | HEIGHT: 70 IN | OXYGEN SATURATION: 98 % | DIASTOLIC BLOOD PRESSURE: 84 MMHG | BODY MASS INDEX: 25.48 KG/M2

## 2022-04-22 DIAGNOSIS — I50.22 CHRONIC SYSTOLIC CONGESTIVE HEART FAILURE, NYHA CLASS 3 (HCC): ICD-10-CM

## 2022-04-22 DIAGNOSIS — I50.21 ACUTE SYSTOLIC HEART FAILURE (HCC): ICD-10-CM

## 2022-04-22 DIAGNOSIS — I47.10 SVT (SUPRAVENTRICULAR TACHYCARDIA) (HCC): ICD-10-CM

## 2022-04-22 DIAGNOSIS — I25.10 STENOSIS OF LEFT ANTERIOR DESCENDING (LAD) ARTERY: ICD-10-CM

## 2022-04-22 DIAGNOSIS — Z79.899 HIGH RISK MEDICATION USE: ICD-10-CM

## 2022-04-22 DIAGNOSIS — E78.5 DYSLIPIDEMIA: ICD-10-CM

## 2022-04-22 DIAGNOSIS — I50.20 ACC/AHA STAGE C SYSTOLIC HEART FAILURE (HCC): ICD-10-CM

## 2022-04-22 DIAGNOSIS — I49.1 ATRIAL PREMATURE CONTRACTIONS: ICD-10-CM

## 2022-04-22 DIAGNOSIS — I48.91 ATRIAL FIBRILLATION WITH RAPID VENTRICULAR RESPONSE (HCC): ICD-10-CM

## 2022-04-22 DIAGNOSIS — I10 PRIMARY HYPERTENSION: ICD-10-CM

## 2022-04-22 DIAGNOSIS — I42.8 NON-ISCHEMIC CARDIOMYOPATHY (HCC): ICD-10-CM

## 2022-04-22 DIAGNOSIS — I49.3 PVCS (PREMATURE VENTRICULAR CONTRACTIONS): ICD-10-CM

## 2022-04-22 PROCEDURE — 99214 OFFICE O/P EST MOD 30 MIN: CPT | Mod: 95 | Performed by: NURSE PRACTITIONER

## 2022-04-22 RX ORDER — LOSARTAN POTASSIUM 50 MG/1
50 TABLET ORAL DAILY
Qty: 90 TABLET | Refills: 3 | Status: SHIPPED | OUTPATIENT
Start: 2022-04-22 | End: 2023-05-17 | Stop reason: SDUPTHER

## 2022-04-22 RX ORDER — TORSEMIDE 20 MG/1
20 TABLET ORAL DAILY
Qty: 90 TABLET | Refills: 3 | Status: SHIPPED | OUTPATIENT
Start: 2022-04-22 | End: 2022-12-09

## 2022-04-22 ASSESSMENT — ENCOUNTER SYMPTOMS
PND: 0
ABDOMINAL PAIN: 0
BRUISES/BLEEDS EASILY: 0
MYALGIAS: 0
PALPITATIONS: 0
VOMITING: 0
TINGLING: 0
BLURRED VISION: 0
CLAUDICATION: 0
FALLS: 0
FEVER: 0
BLOOD IN STOOL: 0
WEIGHT LOSS: 0
LOSS OF CONSCIOUSNESS: 0
COUGH: 0
DIZZINESS: 0
ORTHOPNEA: 0
SHORTNESS OF BREATH: 0
NAUSEA: 0

## 2022-04-22 ASSESSMENT — FIBROSIS 4 INDEX: FIB4 SCORE: 1.33

## 2022-04-22 NOTE — PROGRESS NOTES
"Chief Complaint   Patient presents with   • Atrial Fibrillation     F/V DX: Atrial fibrillation with rapid ventricular response (HCC)      This evaluation was conducted via Zoom using secure and encrypted videoconferencing technology. The patient was in a private location outside of their home in the state [facility].    The patient's identity was confirmed and verbal consent was obtained for this virtual visit.    Subjective     Miah Oneill is a 66 y.o. male who presents today as heart failure follow-up after recent hospitalization on 1/3/2022 for A. fib with RVR acute decompensated heart failure EF 20%.  Patient was last seen by myself on 3/21/2022.    In addition to above patient with past medical history of retinal occlusion. Patient reports he was previously treated for COVID-pneumonia approximately 11/2021 by his primary care provider.    Today, Patient feels \"excellent\", denies chest pain, shortness of breath, palpitations, dizziness/lightheadedness, orthopnea, PND or Edema.  Patient does note productive cough in the morning which resolves during the day.  Otherwise, patient denies irregular heart rhythm.  Patient feels that he is staying in a regular rhythm.  Patient report weight has remained stable at 176 to 178 pounds.  Blood pressure slightly elevated at home 120s-130s/80's.    Patient reports he was unable to obtain Entresto and continued losartan 25 mg daily.  We will uptitrate losartan today as well as decrease diuretic therapy with additional as needed dosing.  We will also obtain 2-week heart monitor to determine A. fib burden.  Patient verbalizes understanding and agreeable to plan of care.      Past Medical History:   Diagnosis Date   • COVID    • Retinal vascular occlusion      History reviewed. No pertinent surgical history.  History reviewed. No pertinent family history.  Social History     Socioeconomic History   • Marital status:      Spouse name: Not on file   • Number of " children: Not on file   • Years of education: Not on file   • Highest education level: Not on file   Occupational History   • Not on file   Tobacco Use   • Smoking status: Former Smoker     Quit date: 2000     Years since quittin.3   • Smokeless tobacco: Never Used   Vaping Use   • Vaping Use: Never used   Substance and Sexual Activity   • Alcohol use: Yes     Comment: occ   • Drug use: Not Currently   • Sexual activity: Not on file   Other Topics Concern   • Not on file   Social History Narrative   • Not on file     Social Determinants of Health     Financial Resource Strain: Not on file   Food Insecurity: Not on file   Transportation Needs: Not on file   Physical Activity: Not on file   Stress: Not on file   Social Connections: Not on file   Intimate Partner Violence: Not on file   Housing Stability: Not on file     No Known Allergies  Outpatient Encounter Medications as of 2022   Medication Sig Dispense Refill   • losartan (COZAAR) 50 MG Tab Take 1 Tablet by mouth every day. 90 Tablet 3   • torsemide (DEMADEX) 20 MG Tab Take 1 Tablet by mouth every day. 90 Tablet 3   • apixaban (ELIQUIS) 5mg Tab Take 1 Tablet by mouth 2 times a day. Indications: Thromboembolism secondary to Atrial Fibrillation 60 Tablet 11   • metoprolol SR (TOPROL XL) 50 MG TABLET SR 24 HR Take 1 Tablet by mouth every day. 30 Tablet 11   • rosuvastatin (CRESTOR) 10 MG Tab Take 2 Tablets by mouth every day. 60 Tablet 11   • spironolactone (ALDACTONE) 25 MG Tab Take 1 Tablet by mouth every day. 30 Tablet 11   • albuterol 108 (90 Base) MCG/ACT Aero Soln inhalation aerosol Inhale 2 Puffs every 6 hours as needed for Shortness of Breath.     • [DISCONTINUED] sacubitril-valsartan (ENTRESTO) 24-26 MG Tab tablet Take 1 Tablet by mouth 2 times a day. 60 Tablet 11   • [DISCONTINUED] sacubitril-valsartan (ENTRESTO) 24-26 MG Tab tablet Take 1 Tablet by mouth 2 times a day. 60 Tablet 0   • [DISCONTINUED] torsemide (DEMADEX) 20 MG Tab Take 1 Tablet  "by mouth 2 times a day. 60 Tablet 11     No facility-administered encounter medications on file as of 4/22/2022.     Review of Systems   Constitutional: Negative for fever, malaise/fatigue and weight loss.   Eyes: Negative for blurred vision.   Respiratory: Negative for cough and shortness of breath.    Cardiovascular: Negative for chest pain, palpitations, orthopnea, claudication, leg swelling and PND.   Gastrointestinal: Negative for abdominal pain, blood in stool, nausea and vomiting.   Genitourinary: Negative for dysuria, frequency and hematuria.   Musculoskeletal: Negative for falls and myalgias.   Neurological: Negative for dizziness, tingling and loss of consciousness.   Endo/Heme/Allergies: Does not bruise/bleed easily.              Objective     /84 (BP Location: Left arm, Patient Position: Sitting, BP Cuff Size: Adult)   Pulse 71   Ht 1.778 m (5' 10\")   Wt 80.7 kg (178 lb)   SpO2 98%   BMI 25.54 kg/m²     Physical Exam   Vitals obtained by patient:    Physical Exam:  Constitutional: Alert, no distress, well-groomed.  Skin: No rashes in visible areas.  Eye: Round. Conjunctiva clear, lids normal. No icterus.   ENMT: Lips pink without lesions, good dentition, moist mucous membranes. Phonation normal.  Neck: No masses, no thyromegaly. Moves freely without pain.  Respiratory: Unlabored respiratory effort, no cough or audible wheeze  Psych: Alert and oriented x3, normal affect and mood.        Lab Results   Component Value Date/Time    CHOLSTRLTOT 114 01/08/2022 04:51 AM    LDL 57 01/08/2022 04:51 AM    HDL 40 01/08/2022 04:51 AM    TRIGLYCERIDE 86 01/08/2022 04:51 AM       Lab Results   Component Value Date/Time    SODIUM 139 01/08/2022 04:51 AM    POTASSIUM 4.3 01/08/2022 04:51 AM    CHLORIDE 100 01/08/2022 04:51 AM    CO2 27 01/08/2022 04:51 AM    GLUCOSE 103 (H) 01/08/2022 04:51 AM    BUN 42 (H) 01/08/2022 04:51 AM    CREATININE 1.68 (H) 01/08/2022 04:51 AM     Lab Results   Component Value " Date/Time    ALKPHOSPHAT 117 (H) 01/03/2022 04:00 PM    ASTSGOT 41 01/03/2022 04:00 PM    ALTSGPT 49 01/03/2022 04:00 PM    TBILIRUBIN 1.2 01/03/2022 04:00 PM      Transthoracic echocardiogram (1/4/22):     No prior study is available for comparison.   The left ventricular ejection fraction is visually estimated to be 20%.  Grade II diastolic dysfunction.  The right ventricle is dilated.  Reduced right ventricular systolic function.  Normal inferior vena cava size and inspiratory collapse.  Moderate mitral regurgitation.  Moderate tricuspid regurgitation.  Estimated right ventricular systolic pressure is 48  mmHg.    [facility] (1/8/2022):  HEMODYNAMICS:   1. Aortic pressure: 87/60 mmHg  2. LVEDP: 14 mmHg  3. No significant aortic gradient on pullback     CORONARY ANGIOGRAPHY:  1. The left main coronary artery: normal appearing, bifurcates to an LAD and L-Cx  2. The left anterior descending coronary artery: large vessel, wraps around the apex, gives off 2 large Diagonal branches, with a 50%-60% stenosis right after D2 take-off, iFR 0.92.  3. The left circumflex coronary artery: large vessel, gives off 2 very small OM1 and OM2 with a large OM3 (has 20% ostial stenosis). True circumflex tapers into the AV groove.  4. The right coronary artery: large vessel, dominant, mild luminal irregularities     INSTANTANEOUS WAVE FREE RATIO:  1. IFR of the mid coronary artery was 0.92     IMPRESSION:  1. Non-obstructive CAD , iFR mid LAD 0.92 suggestive of a non-hemodynamically significant stenosis.  2. Non-ischemic etiology of cardiomyopathy  3. Slightly elevated LVEDP at 14mmHg (normal <= 12 mmHg)     RECOMMENDATIONS:  1. Destin intensity statin  2. Resume Apixaban 5mg PO every 12hours tonight as long no arteriotomy site complications exist  3. GDMT for cardiomyopathy and HF with close Cardiology follow up  4. Lifestyle modifications to reduced ASCVD risk  5. Cardiac rehab referral    Transthoracic Echo (3/21/2022):  CONCLUSIONS  Compared to the prior echo on 1/4/2022, there has been improvement in   left ventricular systolic function.  Mild concentric left ventricular hypertrophy. Moderately reduced left   ventricular systolic function. The left ventricular ejection fraction   is visually estimated to be 40%.   Mild mitral regurgitation.    BMP (4/19/2022): Outside labs, personally reviewed by me notable for creatinine 1.0, BUN 22, GFR 80  Assessment & Plan     1. Atrial fibrillation with rapid ventricular response (HCC)  Holter Monitor Study   2. Primary hypertension  Holter Monitor Study   3. ACC/AHA stage C systolic heart failure (HCC)  Holter Monitor Study   4. High risk medication use  Holter Monitor Study   5. Chronic systolic congestive heart failure, NYHA class 3 (HCC)  Holter Monitor Study   6. Dyslipidemia  Holter Monitor Study   7. Stenosis of left anterior descending (LAD) artery  Holter Monitor Study   8. Non-ischemic cardiomyopathy (HCC)  Holter Monitor Study   9. Acute systolic heart failure (HCC)  torsemide (DEMADEX) 20 MG Tab       Medical Decision Making: Today's Assessment/Status/Plan:        Paroximal Atrial fibrillation with Hx RVR  - S/P successful SABRINA guided DCCV 1/5/2022. Reports regular rhythm  -Continue Metoprolol XL 50 mg daily  - [id number] at least 2, apixaban 5 mg twice daily. Approved for assistance for 1 year.   -Cardiac event monitor ordered to determine afib burden.    Heart failure with reduced EF suspect secondary to tachycardia induced cardiomyopathy versus viral cardiomyopathy; Stage C, Class II, LVEF 40% (recovered from 20%):  -Ischemic cardiomyopathy ruled out  -ACE-I/ARB/ARNI: Unable to obtain entresto. Increase losartan 50 mg daily.   -Evidence Based Beta-blocker:  Continue metoprolol XL 50  -Aldosterone Antagonist:  Continue spironolactone 25 mg daily  -Diuretic:  Decrease torsemide 20 mg daily. Additional dose PRN  -SGLT2i:  Consider in future  -EF 40%- no indication for primary  AICD at this time  -Labs:  reviewed per above  -Reinforced s/sx of worsening heart failure with patient and weight monitoring. Pt verbalizes understanding. Pt to call office if present.    -Heart Failure Education: Reviewed heart failure booklet in office to today with patient answered questions. Pt to be contacted by HF nurse for further education.      Nonobstructive coronary artery disease  -Atorvastatin 80 mg daily  -Beta-blocker per above  -No ASA due to OAC    Renal dysfunction, unclear baseline  -Creatinine 1.0  -CTM    Retinal Occlusion, remote  - cont statin    FU in clinic in 8 weeks. Sooner if needed.    Patient verbalizes understanding and agrees with the plan of care.     I personally spent a total of 26 minutes which includes face-to-face time and non-face-to-face time spent on preparing to see the patient, reviewing prior notes and tests, obtaining history from the patient, performing a medically appropriate exam, counseling and educating the patient, ordering medications/tests/procedures/referrals as clinically indicated,and documenting information in the electronic medical record.    PLEASE NOTE: This Note was created using voice recognition Software. I have made every reasonable attempt to correct obvious errors, but I expect that there are errors of grammar and possibly content that I did not discover before finalizing the note

## 2022-04-22 NOTE — PROGRESS NOTES
Home enrollment completed for the 14 day [employer] Holter monitoring program per VICKIE Zepeda.  >Monitor to be mailed to patient by [employer].  >Currently pending EOS.

## 2022-04-22 NOTE — PATIENT INSTRUCTIONS
Decrease Torsemide 20 mg daily with additional dose as needed for weight gain greater than 3 pounds in 1 day or 5 pounds in 1 week.    Increase losartan 50 mg daily    Holter monitor will be mailed to you

## 2022-04-25 ENCOUNTER — TELEPHONE (OUTPATIENT)
Dept: VASCULAR LAB | Facility: MEDICAL CENTER | Age: 67
End: 2022-04-25
Payer: MEDICARE

## 2022-04-25 NOTE — TELEPHONE ENCOUNTER
Pt missed their f/u Eliquis appointment on 4/20.    Rescheduled on 6/27.    Claudia Frazier, MattieD

## 2022-04-29 ENCOUNTER — TELEPHONE (OUTPATIENT)
Dept: CARDIOLOGY | Facility: MEDICAL CENTER | Age: 67
End: 2022-04-29
Payer: MEDICARE

## 2022-04-29 NOTE — TELEPHONE ENCOUNTER
Pt declined to be schedule for a fv with PAULINA. He said he was already scheduled for a visit with EMMANUEL   English

## 2022-05-18 ENCOUNTER — TELEPHONE (OUTPATIENT)
Dept: CARDIOLOGY | Facility: MEDICAL CENTER | Age: 67
End: 2022-05-18
Payer: MEDICARE

## 2022-05-19 PROCEDURE — 93248 EXT ECG>7D<15D REV&INTERPJ: CPT | Performed by: INTERNAL MEDICINE

## 2022-06-27 ENCOUNTER — OFFICE VISIT (OUTPATIENT)
Dept: CARDIOLOGY | Facility: MEDICAL CENTER | Age: 67
End: 2022-06-27
Payer: MEDICARE

## 2022-06-27 ENCOUNTER — APPOINTMENT (OUTPATIENT)
Dept: VASCULAR LAB | Facility: MEDICAL CENTER | Age: 67
End: 2022-06-27
Attending: INTERNAL MEDICINE
Payer: MEDICARE

## 2022-06-27 VITALS
HEIGHT: 70 IN | BODY MASS INDEX: 26.24 KG/M2 | SYSTOLIC BLOOD PRESSURE: 102 MMHG | WEIGHT: 183.3 LBS | HEART RATE: 78 BPM | DIASTOLIC BLOOD PRESSURE: 72 MMHG | RESPIRATION RATE: 12 BRPM | OXYGEN SATURATION: 91 %

## 2022-06-27 DIAGNOSIS — I50.22 CHRONIC SYSTOLIC HEART FAILURE (HCC): ICD-10-CM

## 2022-06-27 DIAGNOSIS — E78.5 DYSLIPIDEMIA: ICD-10-CM

## 2022-06-27 DIAGNOSIS — I48.0 PAROXYSMAL ATRIAL FIBRILLATION (HCC): ICD-10-CM

## 2022-06-27 DIAGNOSIS — I10 ESSENTIAL HYPERTENSION: ICD-10-CM

## 2022-06-27 PROCEDURE — 99214 OFFICE O/P EST MOD 30 MIN: CPT | Performed by: INTERNAL MEDICINE

## 2022-06-27 ASSESSMENT — FIBROSIS 4 INDEX: FIB4 SCORE: 1.35

## 2022-06-27 NOTE — PROGRESS NOTES
Cardiology Follow-up Consultation Note    Date of note:    6/27/2022    Primary Care Provider: Pcp Pt States None    Name:             Miah Oneill   YOB: 1955  MRN:               [id number]    Chief Complaint   Patient presents with   • Atrial Fibrillation     F/V Dx:Atrial fibrillation with rapid ventricular response (HCC)   • Dyslipidemia   • Congestive Heart Failure     F/V Dx:Acute systolic heart failure (HCC)       HISTORY OF PRESENT ILLNESS  Mr. Miah Oneill is a 67 y.o. 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    Interim History:  Since his last visit, patient continues to feel well.  No concerning cardiovascular symptoms.     Has been monitoring his blood pressure and heart rate on a regular basis with average /70's and HR 60-70s    No symptoms concerning for decompensated heart failure.  Is compliant with medications without any side effects.    Minor bruising with Eliquis otherwise no major bleeding concerns.      Past Medical History:   Diagnosis Date   • COVID    • Retinal vascular occlusion          History reviewed. No pertinent surgical history.      Current Outpatient Medications   Medication Sig Dispense Refill   • losartan (COZAAR) 50 MG Tab Take 1 Tablet by mouth every day. 90 Tablet 3   • torsemide (DEMADEX) 20 MG Tab Take 1 Tablet by mouth every day. 90 Tablet 3   • apixaban (ELIQUIS) 5mg Tab Take 1 Tablet by mouth 2 times a day. Indications: Thromboembolism secondary to Atrial Fibrillation 60 Tablet 11   • metoprolol SR (TOPROL XL) 50 MG TABLET SR 24 HR Take 1 Tablet by mouth every day. 30 Tablet 11   • rosuvastatin (CRESTOR) 10 MG Tab Take 2 Tablets by mouth every day. 60 Tablet 11   • spironolactone (ALDACTONE) 25 MG Tab Take 1 Tablet  "by mouth every day. 30 Tablet 11   • albuterol 108 (90 Base) MCG/ACT Aero Soln inhalation aerosol Inhale 2 Puffs every 6 hours as needed for Shortness of Breath.       No current facility-administered medications for this visit.         No Known Allergies      History reviewed. No pertinent family history.      Social History     Socioeconomic History   • Marital status:      Spouse name: Not on file   • Number of children: Not on file   • Years of education: Not on file   • Highest education level: Not on file   Occupational History   • Not on file   Tobacco Use   • Smoking status: Former Smoker     Quit date:      Years since quittin.   • Smokeless tobacco: Never Used   Vaping Use   • Vaping Use: Never used   Substance and Sexual Activity   • Alcohol use: Yes     Comment: occ   • Drug use: Not Currently   • Sexual activity: Not on file   Other Topics Concern   • Not on file   Social History Narrative   • Not on file     Social Determinants of Health     Financial Resource Strain: Not on file   Food Insecurity: Not on file   Transportation Needs: Not on file   Physical Activity: Not on file   Stress: Not on file   Social Connections: Not on file   Intimate Partner Violence: Not on file   Housing Stability: Not on file         Physical Exam:  Ambulatory Vitals  /72 (BP Location: Left arm, Patient Position: Sitting, BP Cuff Size: Adult)   Pulse 78   Resp 12   Ht 1.778 m (5' 10\")   Wt 83.1 kg (183 lb 4.8 oz)   SpO2 91%    Oxygen Therapy:  Pulse Oximetry: 91 %  BP Readings from Last 4 Encounters:   22 102/72   22 127/84   22 112/64   22 (!) 84/68       Weight/BMI: Body mass index is 26.3 kg/m².  Wt Readings from Last 4 Encounters:   22 83.1 kg (183 lb 4.8 oz)   22 80.7 kg (178 lb)   22 83.9 kg (185 lb)   22 80.3 kg (177 lb)       GEN: Well developed, well nourished and in no acute distress.  HEART: no significant JVD, regular rate and rhythm, " normal S1 and S2, no murmurs, no third heart sounds, normal cardiac palpation  LUNG: clear to auscultation bilaterally, no wheezing, no crackles, normal respiratory effort on room air  ABDOMEN: soft, non-tender, non-distended, normal bowel sounds throughout  EXTREMITIES: no peripheral edema noted  VASCULAR: no significantly elevated jugular venous pressure, no carotid bruits noted, radial pulses 2+ and equal      Lab Data Review:  Lab Results   Component Value Date/Time    CHOLSTRLTOT 114 01/08/2022 04:51 AM    LDL 57 01/08/2022 04:51 AM    HDL 40 01/08/2022 04:51 AM    TRIGLYCERIDE 86 01/08/2022 04:51 AM       Lab Results   Component Value Date/Time    SODIUM 139 01/08/2022 04:51 AM    POTASSIUM 4.3 01/08/2022 04:51 AM    CHLORIDE 100 01/08/2022 04:51 AM    CO2 27 01/08/2022 04:51 AM    GLUCOSE 103 (H) 01/08/2022 04:51 AM    BUN 42 (H) 01/08/2022 04:51 AM    CREATININE 1.68 (H) 01/08/2022 04:51 AM     Lab Results   Component Value Date/Time    ALKPHOSPHAT 117 (H) 01/03/2022 04:00 PM    ASTSGOT 41 01/03/2022 04:00 PM    ALTSGPT 49 01/03/2022 04:00 PM    TBILIRUBIN 1.2 01/03/2022 04:00 PM      Lab Results   Component Value Date/Time    WBC 13.4 (H) 01/05/2022 03:02 AM         Cardiac Imaging and Procedures Review:    EKG dated 1/5/2022: My personal interpretation is sinus rhythm    Echo dated 3/21/2022:   My personal interpretation is mildly reduced left ventricular systolic function with EF 40%    SABRINA: Echocardiogram performed on 1/5/2022 was personally interpreted by me which shows severely reduced left ventricular systolic function with EF 25%    Holter Monitor (5/18/2022):   No episodes of A. fib     LHC (1/8/2022):   CORONARY ANGIOGRAPHY:  1. The left main coronary artery: normal appearing, bifurcates to an LAD and L-Cx  2. The left anterior descending coronary artery: large vessel, wraps around the apex, gives off 2 large Diagonal branches, with a 50%-60% stenosis right after D2 take-off, iFR 0.92.  3. The left  circumflex coronary artery: large vessel, gives off 2 very small OM1 and OM2 with a large OM3 (has 20% ostial stenosis). True circumflex tapers into the AV groove.  4. The right coronary artery: large vessel, dominant, mild luminal irregularities      Radiology test Review:  CXR: Pulmonary congestion    CTA heart (1/6/2022):   Coronary calcification:  LMA - 0.0  LCX - 5.7  LAD - 185  RCA - 0.0  PDA - 0.0  Total Calcium Score: 190.7    IMPRESSION:  1.  Approximately 70% stenosis in the proximal LAD secondary to mixed calcific and soft plaque. Degree of stenosis in the more distal LAD is difficult to quantitate secondary to small vessel size and dense calcific plaque.   2.  Minimal stenosis in the right coronary and left circumflex arteries.   3.  Bilateral pleural effusions with adjacent atelectasis. Mosaic attenuation in the lungs may represent pulmonary edema.      Assessment & Plan     1. Chronic systolic heart failure (HCC)  EC-ECHOCARDIOGRAM COMPLETE W/O CONT   2. Paroxysmal atrial fibrillation (HCC)  EC-ECHOCARDIOGRAM COMPLETE W/O CONT   3. Dyslipidemia     4. Essential hypertension         Obtain repeat echocardiogram in 3 months to monitor systolic function.  Last echocardiogram shows improvement of systolic function from 25 to 40%.  Discussed that if repeat echo continues to show improvement, can discontinue spironolactone 25 mg daily.    Has been maintaining sinus rhythm since January 2022.  Discussed EP referral for consideration of A. fib ablation if there is recurrence.    For CAD and dyslipidemia, continue Crestor 10 mg daily.  LDL within goal.    Blood pressure under excellent control.  Continue losartan 50 mg daily, Toprol-XL 50 mg and spironolactone.    Patient's CSC1NL3-HOPu score is 4 (1 for HTN, 2 for CVA, 1 for age).  Continue anticoagulation with Eliquis 5 mg twice daily.  He understands the risks and benefits of chronic anticoagulation.  We reviewed and verified the results of annual testing  for anemia and kidney function.      All of patient's excellent questions were answered to the best of my knowledge and to his satisfaction.  It was a pleasure seeing Mr. Miah Oneill in my clinic today. Return in about 1 year (around 6/27/2023). Patient is aware to call the cardiology clinic with any questions or concerns.      Magnus Porter MD  [facility]  [facility]  [address], [address] [address]  Phone: [phone]  Fax: [phone]    Please note that this dictation was created using voice recognition software. I have made every reasonable attempt to correct obvious errors, but it is possible there are errors of grammar and possibly content that I did not discover before finalizing the note.

## 2022-06-28 ENCOUNTER — ANTICOAGULATION MONITORING (OUTPATIENT)
Dept: VASCULAR LAB | Facility: MEDICAL CENTER | Age: 67
End: 2022-06-28
Payer: MEDICARE

## 2022-06-28 NOTE — PROGRESS NOTES
Discharged from [facility] as [facility] cardiology will be managing anticoagulation    Patient (per Dr. Porter pt no longer needs this appt)    Claudia Frazier, PharmD

## 2022-12-06 ENCOUNTER — ANCILLARY PROCEDURE (OUTPATIENT)
Dept: CARDIOLOGY | Facility: MEDICAL CENTER | Age: 67
End: 2022-12-06
Attending: INTERNAL MEDICINE
Payer: MEDICARE

## 2022-12-06 DIAGNOSIS — I48.0 PAROXYSMAL ATRIAL FIBRILLATION (HCC): ICD-10-CM

## 2022-12-06 DIAGNOSIS — I50.21 ACUTE SYSTOLIC HEART FAILURE (HCC): ICD-10-CM

## 2022-12-06 DIAGNOSIS — I50.22 CHRONIC SYSTOLIC HEART FAILURE (HCC): ICD-10-CM

## 2022-12-06 PROCEDURE — 93306 TTE W/DOPPLER COMPLETE: CPT

## 2022-12-07 LAB
LV EJECT FRACT  99904: 55
LV EJECT FRACT MOD 2C 99903: 55.77
LV EJECT FRACT MOD 4C 99902: 58.51
LV EJECT FRACT MOD BP 99901: 55.68

## 2022-12-07 PROCEDURE — 93306 TTE W/DOPPLER COMPLETE: CPT | Mod: 26 | Performed by: INTERNAL MEDICINE

## 2022-12-08 NOTE — TELEPHONE ENCOUNTER
Is the patient due for a refill? No    Was the patient seen the past year? Yes    Date of last office visit: 6/27/2022    Does the patient have an upcoming appointment?  No       Provider to refill:DA    Does the patients insurance require a 100 day supply?  No

## 2022-12-09 RX ORDER — TORSEMIDE 20 MG/1
TABLET ORAL
Qty: 90 TABLET | Refills: 1 | Status: SHIPPED | OUTPATIENT
Start: 2022-12-09 | End: 2023-06-02

## 2023-01-18 DIAGNOSIS — I50.21 ACUTE SYSTOLIC HEART FAILURE (HCC): ICD-10-CM

## 2023-01-20 RX ORDER — METOPROLOL SUCCINATE 50 MG/1
TABLET, EXTENDED RELEASE ORAL
Qty: 90 TABLET | Refills: 1 | Status: SHIPPED | OUTPATIENT
Start: 2023-01-20 | End: 2023-07-31

## 2023-01-20 RX ORDER — SPIRONOLACTONE 25 MG/1
TABLET ORAL
Qty: 90 TABLET | Refills: 1 | Status: SHIPPED | OUTPATIENT
Start: 2023-01-20 | End: 2023-07-31

## 2023-01-20 NOTE — TELEPHONE ENCOUNTER
Is the patient due for a refill? Yes    Was the patient seen the past year? Yes    Date of last office visit: 6/27/22    Does the patient have an upcoming appointment?  No       Provider to refill: DA     Does the patients insurance require a 100 day supply?  No     Patient arrived to ED today with c/o abdominal pains, vomiting, diarrhea, for the past 4 days.  Patient was seen by PMD office two days ago.  Patient states after eating steak 4 days ago.

## 2023-05-17 ENCOUNTER — TELEPHONE (OUTPATIENT)
Dept: CARDIOLOGY | Facility: MEDICAL CENTER | Age: 68
End: 2023-05-17
Payer: MEDICARE

## 2023-05-17 DIAGNOSIS — I10 ESSENTIAL HYPERTENSION: ICD-10-CM

## 2023-05-17 DIAGNOSIS — I50.22 CHRONIC SYSTOLIC HEART FAILURE (HCC): ICD-10-CM

## 2023-05-17 RX ORDER — LOSARTAN POTASSIUM 50 MG/1
50 TABLET ORAL DAILY
Qty: 90 TABLET | Refills: 3 | Status: SHIPPED | OUTPATIENT
Start: 2023-05-17

## 2023-05-17 NOTE — TELEPHONE ENCOUNTER
Refill sent to pharmacy per pt request    losartan (COZAAR) 50 MG Tab 90 Tablet 3/3 5/17/2023     Sig - Route: Take 1 Tablet by mouth every day. - Oral    Sent to pharmacy as: Losartan Potassium 50 MG Oral Tablet (COZAAR)    Cosign for Ordering: Required by Magnus Porter M.D.    E-Prescribing Status: Receipt confirmed by pharmacy (5/17/2023  9:05 AM PDT)      Pharmacy    [id number] - [place], [place] - [address] [place] [address]

## 2023-05-17 NOTE — TELEPHONE ENCOUNTER
EMMANUEL  Caller: Miah Oneill     Medication Name and Dosage:  losartan (COZAAR) 50 MG Tab      Medication amount left: 0    Preferred Pharmacy: [id number] - [place], [place] - [address] [place] [address]    Other questions (Topic): EMMANUEL last seen 6/22- sched with AM 6/21/23    Callback Number (Will only call for issues): [phone]      Thank you   Nany DECKER

## 2023-05-31 DIAGNOSIS — I50.21 ACUTE SYSTOLIC HEART FAILURE (HCC): ICD-10-CM

## 2023-05-31 DIAGNOSIS — I50.22 CHRONIC SYSTOLIC CONGESTIVE HEART FAILURE, NYHA CLASS 3 (HCC): ICD-10-CM

## 2023-05-31 DIAGNOSIS — I50.22 CHRONIC SYSTOLIC HEART FAILURE (HCC): ICD-10-CM

## 2023-05-31 DIAGNOSIS — I10 ESSENTIAL HYPERTENSION: ICD-10-CM

## 2023-06-02 ENCOUNTER — TELEPHONE (OUTPATIENT)
Dept: CARDIOLOGY | Facility: MEDICAL CENTER | Age: 68
End: 2023-06-02
Payer: MEDICARE

## 2023-06-02 RX ORDER — TORSEMIDE 20 MG/1
TABLET ORAL
Qty: 90 TABLET | Refills: 1 | Status: SHIPPED | OUTPATIENT
Start: 2023-06-02 | End: 2023-12-01 | Stop reason: SDUPTHER

## 2023-06-02 NOTE — TELEPHONE ENCOUNTER
EMMANUEL    Caller: Miah Oneill     Medication Name and Dosage:  torsemide (DEMADEX) 20 MG Tab [[id number]    Medication amount left: 1 DAY    Preferred Pharmacy: [facility] on file    Other questions (Topic): N/A Please see encounter on 05/31/2023    Callback Number (Will only call for issues): [phone] (home)      Thank You    Constance VIEIRA

## 2023-06-02 NOTE — TELEPHONE ENCOUNTER
Refill completed by DA   Lab order placed and mailed to home address per DA    torsemide (DEMADEX) 20 MG Tab 90 Tablet 1/1 6/2/2023     Sig: take 1 tablet by mouth once daily    Sent to pharmacy as: Torsemide 20 MG Oral Tablet (DEMADEX)    E-Prescribing Status: Receipt confirmed by pharmacy (6/2/2023 11:34 AM PDT)      Pharmacy    [id number] - [place], [place]  [address] [place] [address]

## 2023-06-13 DIAGNOSIS — I50.22 CHRONIC SYSTOLIC HEART FAILURE (HCC): ICD-10-CM

## 2023-06-13 DIAGNOSIS — I50.21 ACUTE SYSTOLIC HEART FAILURE (HCC): ICD-10-CM

## 2023-06-13 DIAGNOSIS — I10 ESSENTIAL HYPERTENSION: ICD-10-CM

## 2023-06-13 DIAGNOSIS — I50.22 CHRONIC SYSTOLIC CONGESTIVE HEART FAILURE, NYHA CLASS 3 (HCC): ICD-10-CM

## 2023-06-21 ENCOUNTER — OFFICE VISIT (OUTPATIENT)
Dept: CARDIOLOGY | Facility: MEDICAL CENTER | Age: 68
End: 2023-06-21
Attending: PHYSICIAN ASSISTANT
Payer: MEDICARE

## 2023-06-21 ENCOUNTER — PHARMACY VISIT (OUTPATIENT)
Dept: PHARMACY | Facility: MEDICAL CENTER | Age: 68
End: 2023-06-21
Payer: COMMERCIAL

## 2023-06-21 VITALS
RESPIRATION RATE: 16 BRPM | HEART RATE: 70 BPM | SYSTOLIC BLOOD PRESSURE: 120 MMHG | BODY MASS INDEX: 27.49 KG/M2 | HEIGHT: 70 IN | OXYGEN SATURATION: 94 % | DIASTOLIC BLOOD PRESSURE: 80 MMHG | WEIGHT: 192 LBS

## 2023-06-21 DIAGNOSIS — I25.10 STENOSIS OF LEFT ANTERIOR DESCENDING (LAD) ARTERY: ICD-10-CM

## 2023-06-21 DIAGNOSIS — I48.0 PAROXYSMAL ATRIAL FIBRILLATION (HCC): ICD-10-CM

## 2023-06-21 DIAGNOSIS — H34.9 RETINAL VESSEL OCCLUSION: ICD-10-CM

## 2023-06-21 DIAGNOSIS — I50.20 ACC/AHA STAGE C SYSTOLIC HEART FAILURE (HCC): ICD-10-CM

## 2023-06-21 DIAGNOSIS — I10 ESSENTIAL HYPERTENSION: ICD-10-CM

## 2023-06-21 DIAGNOSIS — I42.8 NON-ISCHEMIC CARDIOMYOPATHY (HCC): ICD-10-CM

## 2023-06-21 DIAGNOSIS — Z79.01 CHRONIC ANTICOAGULATION: ICD-10-CM

## 2023-06-21 DIAGNOSIS — I50.22 CHRONIC SYSTOLIC HEART FAILURE (HCC): ICD-10-CM

## 2023-06-21 DIAGNOSIS — E78.5 DYSLIPIDEMIA: ICD-10-CM

## 2023-06-21 DIAGNOSIS — Z79.899 HIGH RISK MEDICATION USE: ICD-10-CM

## 2023-06-21 DIAGNOSIS — I50.9 HEART FAILURE, NYHA CLASS 1 (HCC): ICD-10-CM

## 2023-06-21 PROCEDURE — 3074F SYST BP LT 130 MM HG: CPT | Performed by: PHYSICIAN ASSISTANT

## 2023-06-21 PROCEDURE — 99214 OFFICE O/P EST MOD 30 MIN: CPT | Performed by: PHYSICIAN ASSISTANT

## 2023-06-21 PROCEDURE — RXMED WILLOW AMBULATORY MEDICATION CHARGE: Performed by: PHYSICIAN ASSISTANT

## 2023-06-21 PROCEDURE — 3079F DIAST BP 80-89 MM HG: CPT | Performed by: PHYSICIAN ASSISTANT

## 2023-06-21 ASSESSMENT — ENCOUNTER SYMPTOMS
NAUSEA: 0
MYALGIAS: 0
COUGH: 0
FEVER: 0
MUSCULOSKELETAL NEGATIVE: 1
WEAKNESS: 0
GASTROINTESTINAL NEGATIVE: 1
HEADACHES: 0
LOSS OF CONSCIOUSNESS: 0
ORTHOPNEA: 0
CLAUDICATION: 0
DOUBLE VISION: 0
VOMITING: 0
BRUISES/BLEEDS EASILY: 0
PSYCHIATRIC NEGATIVE: 1
BLURRED VISION: 0
PALPITATIONS: 0
SHORTNESS OF BREATH: 0
CHILLS: 0
DIZZINESS: 0
EYES NEGATIVE: 1
NEUROLOGICAL NEGATIVE: 1
CONSTITUTIONAL NEGATIVE: 1
PND: 0
ABDOMINAL PAIN: 0

## 2023-06-21 ASSESSMENT — MINNESOTA LIVING WITH HEART FAILURE QUESTIONNAIRE (MLHF)
WALKING ABOUT OR CLIMBING STAIRS DIFFICULT: 0
GIVING YOU SIDE EFFECTS FROM TREATMENTS: 0
TIRED, FATIGUED OR LOW ON ENERGY: 0
EATING LESS FOODS YOU LIKE: 0
MAKING YOU SHORT OF BREATH: 0
DIFFICULTY WITH RECREATIONAL PASTIMES, SPORTS, HOBBIES: 0
DIFFICULTY SOCIALIZING WITH FAMILY OR FRIENDS: 0
DIFFICULTY SLEEPING WELL AT NIGHT: 0
COSTING YOU MONEY FOR MEDICAL CARE: 0
HAVING TO SIT OR LIE DOWN DURING THE DAY: 0
DIFFICULTY TO CONCENTRATE OR REMEMBERING THINGS: 0
FEELING LIKE A BURDEN TO FAMILY AND FRIENDS: 0
WORKING AROUND THE HOUSE OR YARD DIFFICULT: 0
DIFFICULTY GOING AWAY FROM HOME: 0
DIFFICULTY WITH SEXUAL ACTIVITIES: 0
TOTAL_SCORE: 0
LOSS OF SELF CONTROL IN YOUR LIFE: 0
MAKING YOU WORRY: 0
SWELLING IN ANKLES OR LEGS: 0
MAKING YOU FEEL DEPRESSED: 0
MAKING YOU STAY IN A HOSPITAL: 0
DIFFICULTY WORKING TO EARN A LIVING: 0

## 2023-06-21 ASSESSMENT — FIBROSIS 4 INDEX: FIB4 SCORE: 1.37

## 2023-06-21 NOTE — PROGRESS NOTES
Chief Complaint   Patient presents with    Atrial Fibrillation    CHF (Systolic)    Dyslipidemia       Subjective     MILLY Oneill is a 68 y.o. male with a history of paroxysmal atrial fibrillation, tachycardia induced cardiomyopathy with improved LVEF to 40%, HFrEF, dyslipidemia and nonobstructive CAD who presents today for yearly follow-up.    His primary cardiologist is Dr. Porter.  He is also followed in the heart failure clinic by Mary Kay Harding.  Last seen 2022 by Dr. Porter.  At that exam he was doing well and did not have any concerning cardiovascular symptoms.  His blood pressure and heart rate was well controlled.  He was recommended to repeat an echocardiogram in 3 months to monitor his systolic function.  His other medications were continued and he was to follow-up in 1 year.    Today, he reports he is doing well and is without cardiac complaints. No chest pain or palpitations. No shortness of breath, dyspnea on exertion, orthopnea or PND. No lower extremity edema. No dizziness or lightheadedness. No syncope or presyncope.      Past Medical History:   Diagnosis Date    COVID     Retinal vascular occlusion      History reviewed. No pertinent surgical history.  History reviewed. No pertinent family history.  Social History     Socioeconomic History    Marital status:      Spouse name: Not on file    Number of children: Not on file    Years of education: Not on file    Highest education level: Not on file   Occupational History    Not on file   Tobacco Use    Smoking status: Former     Types: Cigarettes     Quit date:      Years since quittin.6    Smokeless tobacco: Never   Vaping Use    Vaping Use: Never used   Substance and Sexual Activity    Alcohol use: Yes     Comment: occ    Drug use: Not Currently    Sexual activity: Not on file   Other Topics Concern    Not on file   Social History Narrative    Not on file     Social Determinants of Health     Financial Resource  Strain: Not on file   Food Insecurity: Not on file   Transportation Needs: Not on file   Physical Activity: Not on file   Stress: Not on file   Social Connections: Not on file   Intimate Partner Violence: Not on file   Housing Stability: Not on file     No Known Allergies  Outpatient Encounter Medications as of 6/21/2023   Medication Sig Dispense Refill    apixaban (ELIQUIS) 5mg Tab Take 1 Tablet by mouth 2 times a day. Indications: Thromboembolism secondary to Atrial Fibrillation 60 Tablet 0    torsemide (DEMADEX) 20 MG Tab take 1 tablet by mouth once daily 90 Tablet 1    losartan (COZAAR) 50 MG Tab Take 1 Tablet by mouth every day. 90 Tablet 3    rosuvastatin (CRESTOR) 10 MG Tab Take 2 Tablets by mouth every day. 180 Tablet 2    [DISCONTINUED] spironolactone (ALDACTONE) 25 MG Tab take 1 tablet by mouth once daily 90 Tablet 1    [DISCONTINUED] metoprolol SR (TOPROL XL) 50 MG TABLET SR 24 HR take 1 tablet by mouth once daily 90 Tablet 1    albuterol 108 (90 Base) MCG/ACT Aero Soln inhalation aerosol Inhale 2 Puffs every 6 hours as needed for Shortness of Breath.      [DISCONTINUED] apixaban (ELIQUIS) 5mg Tab Take 1 Tablet by mouth 2 times a day. Indications: Thromboembolism secondary to Atrial Fibrillation 60 Tablet 11     No facility-administered encounter medications on file as of 6/21/2023.     Review of Systems   Constitutional: Negative.  Negative for chills, fever and malaise/fatigue.   HENT: Negative.     Eyes: Negative.  Negative for blurred vision and double vision.   Respiratory:  Negative for cough and shortness of breath.    Cardiovascular:  Negative for chest pain, palpitations, orthopnea, claudication, leg swelling and PND.   Gastrointestinal: Negative.  Negative for abdominal pain, nausea and vomiting.   Genitourinary: Negative.    Musculoskeletal: Negative.  Negative for myalgias.   Skin: Negative.  Negative for rash.   Neurological: Negative.  Negative for dizziness, loss of consciousness, weakness  "and headaches.   Endo/Heme/Allergies: Negative.  Does not bruise/bleed easily.   Psychiatric/Behavioral: Negative.                Objective     /80 (BP Location: Left arm, Patient Position: Sitting, BP Cuff Size: Adult)   Pulse 70   Resp 16   Ht 1.778 m (5' 10\")   Wt 87.1 kg (192 lb)   SpO2 94%   BMI 27.55 kg/m²     Physical Exam  Vitals reviewed.   Constitutional:       General: He is not in acute distress.     Appearance: Normal appearance.   HENT:      Head: Normocephalic and atraumatic.      Right Ear: External ear normal.      Left Ear: External ear normal.   Eyes:      General: No scleral icterus.     Extraocular Movements: Extraocular movements intact.      Conjunctiva/sclera: Conjunctivae normal.      Pupils: Pupils are equal, round, and reactive to light.   Cardiovascular:      Rate and Rhythm: Normal rate and regular rhythm.      Pulses: Normal pulses.      Heart sounds: Normal heart sounds. No murmur heard.     No friction rub. No gallop.   Pulmonary:      Effort: Pulmonary effort is normal.      Breath sounds: Normal breath sounds.   Abdominal:      General: Bowel sounds are normal.      Palpations: Abdomen is soft.      Tenderness: There is no abdominal tenderness.   Musculoskeletal:         General: Normal range of motion.      Cervical back: Normal range of motion and neck supple.      Right lower leg: No edema.      Left lower leg: No edema.   Skin:     General: Skin is warm and dry.      Capillary Refill: Capillary refill takes less than 2 seconds.   Neurological:      General: No focal deficit present.      Mental Status: He is alert and oriented to person, place, and time.   Psychiatric:         Mood and Affect: Mood normal.         Behavior: Behavior normal.         Judgment: Judgment normal.        Latest Reference Range & Units 01/08/22 04:51   Sodium 135 - 145 mmol/L 139   Potassium 3.6 - 5.5 mmol/L 4.3   Chloride 96 - 112 mmol/L 100   Co2 20 - 33 mmol/L 27   Anion Gap 7.0 - 16.0  " 12.0   Glucose 65 - 99 mg/dL 103 (H)   Bun 8 - 22 mg/dL 42 (H)   Creatinine 0.50 - 1.40 mg/dL 1.68 (H)   GFR If African American >60 mL/min/1.73 m 2 50 !   GFR If Non African American >60 mL/min/1.73 m 2 41 !   Calcium 8.5 - 10.5 mg/dL 8.7   Magnesium 1.5 - 2.5 mg/dL 2.2   Cholesterol,Tot 100 - 199 mg/dL 114   Triglycerides 0 - 149 mg/dL 86   HDL >=40 mg/dL 40   LDL <100 mg/dL 57   (H): Data is abnormally high  !: Data is abnormal    Cardiovascular imaging and procedures:    Echocardiogram 12/6/2022  CONCLUSIONS  Compared to the prior study on 03/21/2022, systolic function is now   normal with trace mitral regurgitation.  Normal left ventricular systolic function.   The left ventricular ejection fraction is estimated to be 55%.  No significant valvular abnormalities.     Echocardiogram 3/21/2022  CONCLUSIONS  Compared to the prior echo on 1/4/2022, there has been improvement in   left ventricular systolic function.  Mild concentric left ventricular hypertrophy. Moderately reduced left   ventricular systolic function. The left ventricular ejection fraction   is visually estimated to be 40%.   Mild mitral regurgitation.    SABRINA 1/5/2022  CONCLUSIONS  Severely reduced left ventricular systolic function.  Normal left atrial appendage. No thrombus detected in the left atrial   appendage.  Moderate mitral regurgitation. The calculated effective regurgitant   orifice is 0.31 sq cm.     Echocardiogram 1/4/2022  CONCLUSIONS  No prior study is available for comparison.   The left ventricular ejection fraction is visually estimated to be 20%.  Grade II diastolic dysfunction.  The right ventricle is dilated.  Reduced right ventricular systolic function.  Normal inferior vena cava size and inspiratory collapse.  Moderate mitral regurgitation.  Moderate tricuspid regurgitation.  Estimated right ventricular systolic pressure is 48  mmHg.    Cardiac event monitoring 5/18/2022  Summary:   Short runs of supraventricular tachycardia  that do not meet criteria for diagnosis of atrial flutter or atrial fibrillation.   Rare premature atrial complexes (<1%) and premature ventricular complexes (<1%).   No malignant arrhythmias identified.   No sinus pause.   No significant AV block.   Electronically Signed by: Mac Mendez M.D.     [facility] 1/8/2022  CORONARY ANGIOGRAPHY:  The left main coronary artery: normal appearing, bifurcates to an LAD and L-Cx  The left anterior descending coronary artery: large vessel, wraps around the apex, gives off 2 large Diagonal branches, with a 50%-60% stenosis right after D2 take-off, iFR 0.92.  The left circumflex coronary artery: large vessel, gives off 2 very small OM1 and OM2 with a large OM3 (has 20% ostial stenosis). True circumflex tapers into the AV groove.  The right coronary artery: large vessel, dominant, mild luminal irregularities     INSTANTANEOUS WAVE FREE RATIO:  IFR of the mid coronary artery was 0.92     IMPRESSION:  Non-obstructive CAD , iFR mid LAD 0.92 suggestive of a non-hemodynamically significant stenosis.  Non-ischemic etiology of cardiomyopathy  Slightly elevated LVEDP at 14mmHg (normal <= 12 mmHg)     RECOMMENDATIONS:  [place] intensity statin  Resume Apixaban 5mg PO every 12hours tonight as long no arteriotomy site complications exist  GDMT for cardiomyopathy and HF with close Cardiology follow up  Lifestyle modifications to reduced ASCVD risk  Cardiac rehab referral         Assessment & Plan     1. Paroxysmal atrial fibrillation (HCC)  apixaban (ELIQUIS) 5mg Tab    CBC WITH DIFFERENTIAL      2. Chronic anticoagulation  CBC WITH DIFFERENTIAL      3. High risk medication use        4. Dyslipidemia  Lipid Profile      5. Chronic systolic heart failure (HCC)        6. Non-ischemic cardiomyopathy (HCC)        7. ACC/AHA stage C systolic heart failure (HCC)        8. Heart failure, NYHA class 1 (HCC)        9. Essential hypertension        10. Stenosis of left anterior descending (LAD) artery         11. Retinal vessel occlusion            Medical Decision Making: Today's Assessment/Status/Plan:        Paroximal Atrial fibrillation with Hx RVR  -Asymptomatic  - S/P successful SABRINA guided DCCV 1/5/2022.  Remains in sinus rhythm in office today without evidence of recurrence.  -Continue Metoprolol XL 50 mg daily  - [id number] at least 5, continue apixaban 5 mg twice daily  -Repeat CBC and CMP     Heart failure with reduced EF suspect secondary to tachycardia induced cardiomyopathy; Stage C, Class I, LVEF 55% (recovered from 20%):  Hypertension  -Ischemic cardiomyopathy ruled out  -ACE-I/ARB/ARNI: Continue losartan 50mg daily  -Evidence Based Beta-blocker:  Continue metoprolol XL 50  -Aldosterone Antagonist:  Continue spironolactone 25 mg daily  -Diuretic:  Continue torsemide 20 mg daily  -EF recovered to within normal limits- no indication for primary AICD at this time  -Labs:  Repeat CMP, CBC and lipid panel  -Reinforced s/sx of worsening heart failure with patient and weight monitoring. Pt verbalizes understanding. Pt to call office if present.       Nonobstructive coronary artery disease  -Asymptomatic  - Atorvastatin 80 mg daily  -Beta-blocker per above  -No ASA due to OAC     Retinal Occlusion, remote  - cont statin     Follow up in 1 year or sooner with clinical changes.    Encouraged him to reach out via MyChart or telephone with any questions or concerns.    Thank you for allowing me to participate in the care of Miah Lynnettestacy Nino .    Suzanne Neri PA-C, Cardiology  [facility]  [place] [facility], [facility]  [address] [address]  Phone: [phone]  Fax: [phone]    PLEASE NOTE: This note was created using voice recognition software. I have made every reasonable attempt to correct obvious errors, but I expect that there are errors of grammar and possibly content that I did not discover before finalizing the note.

## 2023-06-26 LAB
CHOLEST SERPL-MCNC: 134 MG/DL
HDLC SERPL-MCNC: 63 MG/DL
LDLC SERPL CALC-MCNC: 53 MG/DL
TRIGL SERPL-MCNC: 91 MG/DL

## 2023-07-10 DIAGNOSIS — Z79.01 CHRONIC ANTICOAGULATION: ICD-10-CM

## 2023-07-10 DIAGNOSIS — I48.91 ATRIAL FIBRILLATION WITH RAPID VENTRICULAR RESPONSE (HCC): ICD-10-CM

## 2023-07-13 DIAGNOSIS — E78.5 DYSLIPIDEMIA: ICD-10-CM

## 2023-07-28 DIAGNOSIS — I50.21 ACUTE SYSTOLIC HEART FAILURE (HCC): ICD-10-CM

## 2023-07-31 RX ORDER — METOPROLOL SUCCINATE 50 MG/1
TABLET, EXTENDED RELEASE ORAL
Qty: 90 TABLET | Refills: 3 | Status: SHIPPED | OUTPATIENT
Start: 2023-07-31

## 2023-07-31 RX ORDER — SPIRONOLACTONE 25 MG/1
TABLET ORAL
Qty: 90 TABLET | Refills: 3 | Status: SHIPPED | OUTPATIENT
Start: 2023-07-31

## 2023-11-10 DIAGNOSIS — I50.21 ACUTE SYSTOLIC HEART FAILURE (HCC): ICD-10-CM

## 2023-11-14 RX ORDER — ROSUVASTATIN CALCIUM 10 MG/1
20 TABLET, COATED ORAL DAILY
Qty: 180 TABLET | Refills: 2 | Status: SHIPPED | OUTPATIENT
Start: 2023-11-14

## 2023-11-14 NOTE — TELEPHONE ENCOUNTER
Is the patient due for a refill? Yes    Was the patient seen the past year? Yes    Date of last office visit: 6/21/2023    Does the patient have an upcoming appointment?  No    Provider to refill:JOSELITO (AM MAIRA)    Does the patients insurance require a 100 day supply?  No

## 2023-12-01 DIAGNOSIS — I50.21 ACUTE SYSTOLIC HEART FAILURE (HCC): ICD-10-CM

## 2023-12-01 NOTE — TELEPHONE ENCOUNTER
Is the patient due for a refill? Yes    Was the patient seen the past year? Yes    Date of last office visit: 06/21/23    Does the patient have an upcoming appointment?  No    Provider to refill:AM    Does the patients insurance require a 100 day supply?  No

## 2023-12-04 RX ORDER — TORSEMIDE 20 MG/1
20 TABLET ORAL DAILY
Qty: 90 TABLET | Refills: 1 | Status: SHIPPED | OUTPATIENT
Start: 2023-12-04

## 2024-03-27 DIAGNOSIS — I48.0 PAROXYSMAL ATRIAL FIBRILLATION (HCC): ICD-10-CM

## 2024-03-27 NOTE — TELEPHONE ENCOUNTER
AM    Received request via: Patient    Was the patient seen in the last year in this department? Yes 06.21.23    Does the patient have an active prescription (recently filled or refills available) for medication(s) requested? No    Pharmacy Name: [facility]    Does the patient have correction Plus and need 100 day supply (blood pressure, diabetes and cholesterol meds only)? Patient does not have SCP     Patient is out of the medication.     Thank you,     Michaela YBARRA

## 2024-04-09 ENCOUNTER — PHARMACY VISIT (OUTPATIENT)
Dept: PHARMACY | Facility: MEDICAL CENTER | Age: 69
End: 2024-04-09
Payer: COMMERCIAL

## 2024-04-09 ENCOUNTER — OFFICE VISIT (OUTPATIENT)
Dept: CARDIOLOGY | Facility: MEDICAL CENTER | Age: 69
End: 2024-04-09
Attending: PHYSICIAN ASSISTANT
Payer: MEDICARE

## 2024-04-09 VITALS
RESPIRATION RATE: 16 BRPM | DIASTOLIC BLOOD PRESSURE: 62 MMHG | SYSTOLIC BLOOD PRESSURE: 100 MMHG | HEART RATE: 86 BPM | BODY MASS INDEX: 27.92 KG/M2 | WEIGHT: 195 LBS | OXYGEN SATURATION: 90 % | HEIGHT: 70 IN

## 2024-04-09 DIAGNOSIS — H34.9 RETINAL VESSEL OCCLUSION: ICD-10-CM

## 2024-04-09 DIAGNOSIS — I42.8 NON-ISCHEMIC CARDIOMYOPATHY (HCC): ICD-10-CM

## 2024-04-09 DIAGNOSIS — E78.5 DYSLIPIDEMIA: ICD-10-CM

## 2024-04-09 DIAGNOSIS — I48.0 PAROXYSMAL ATRIAL FIBRILLATION (HCC): ICD-10-CM

## 2024-04-09 DIAGNOSIS — Z79.01 CHRONIC ANTICOAGULATION: ICD-10-CM

## 2024-04-09 DIAGNOSIS — I50.20 ACC/AHA STAGE C SYSTOLIC HEART FAILURE (HCC): ICD-10-CM

## 2024-04-09 DIAGNOSIS — I10 PRIMARY HYPERTENSION: ICD-10-CM

## 2024-04-09 DIAGNOSIS — I25.10 STENOSIS OF LEFT ANTERIOR DESCENDING (LAD) ARTERY: ICD-10-CM

## 2024-04-09 DIAGNOSIS — Z79.899 HIGH RISK MEDICATION USE: ICD-10-CM

## 2024-04-09 DIAGNOSIS — I25.10 CORONARY ARTERY DISEASE DUE TO CALCIFIED CORONARY LESION: ICD-10-CM

## 2024-04-09 DIAGNOSIS — I25.84 CORONARY ARTERY DISEASE DUE TO CALCIFIED CORONARY LESION: ICD-10-CM

## 2024-04-09 DIAGNOSIS — I50.22 CHRONIC SYSTOLIC CONGESTIVE HEART FAILURE, NYHA CLASS 1 (HCC): ICD-10-CM

## 2024-04-09 PROCEDURE — 99214 OFFICE O/P EST MOD 30 MIN: CPT | Performed by: PHYSICIAN ASSISTANT

## 2024-04-09 PROCEDURE — RXMED WILLOW AMBULATORY MEDICATION CHARGE: Performed by: PHYSICIAN ASSISTANT

## 2024-04-09 PROCEDURE — 3078F DIAST BP <80 MM HG: CPT | Performed by: PHYSICIAN ASSISTANT

## 2024-04-09 PROCEDURE — 3074F SYST BP LT 130 MM HG: CPT | Performed by: PHYSICIAN ASSISTANT

## 2024-04-09 ASSESSMENT — ENCOUNTER SYMPTOMS
ORTHOPNEA: 0
CLAUDICATION: 0
DOUBLE VISION: 0
BRUISES/BLEEDS EASILY: 0
MUSCULOSKELETAL NEGATIVE: 1
NAUSEA: 0
VOMITING: 0
LOSS OF CONSCIOUSNESS: 0
CONSTITUTIONAL NEGATIVE: 1
PALPITATIONS: 0
MYALGIAS: 0
BLURRED VISION: 0
WEAKNESS: 0
NEUROLOGICAL NEGATIVE: 1
EYES NEGATIVE: 1
SHORTNESS OF BREATH: 0
COUGH: 0
PND: 0
DIZZINESS: 0
GASTROINTESTINAL NEGATIVE: 1
ABDOMINAL PAIN: 0
FEVER: 0
PSYCHIATRIC NEGATIVE: 1
HEADACHES: 0
CHILLS: 0

## 2024-04-09 NOTE — PROGRESS NOTES
Chief Complaint   Patient presents with   • Atrial Fibrillation     F/V Dx: Paroxysmal atrial fibrillation (HCC)     • Follow-Up     Dx: Chronic systolic heart failure (HCC)       Subjective     MILLY Oneill is a 68 y.o. 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 No pertinent surgical history.  History reviewed. No pertinent family history.  Social History     Socioeconomic History   • Marital status:      Spouse name: Not on file   • Number of children: Not on file   • Years of education: Not on file   • Highest education level: Not on file   Occupational History   • Not on file   Tobacco Use   • Smoking status: Former     Current packs/day: 0.00     Types: Cigarettes     Quit date:      Years since quittin.2   • Smokeless tobacco: Never   Vaping Use   • Vaping Use: Never used   Substance and Sexual Activity   • Alcohol use: Yes     Comment: occ   • Drug use: Not Currently   • Sexual activity: Not on file   Other Topics Concern   • Not on file   Social History Narrative   • Not on file     Social Determinants of Health     Financial Resource Strain: Not on file   Food Insecurity: Not on file   Transportation Needs: Not on file   Physical Activity: Not on file   Stress: Not on file   Social Connections: Not on file   Intimate Partner Violence: Not on file   Housing Stability: Not on file     No Known Allergies  Outpatient Encounter Medications as of 2024   Medication Sig Dispense Refill   • torsemide (DEMADEX) 20 MG Tab Take 1 Tablet by mouth every day. 90 Tablet 1   • rosuvastatin (CRESTOR) 10 MG Tab take 2 tablets by mouth once daily 180 Tablet 2   • metoprolol SR (TOPROL XL) 50 MG TABLET SR 24 HR take 1 tablet by mouth once daily 90 Tablet  "3   • spironolactone (ALDACTONE) 25 MG Tab take 1 tablet by mouth once daily 90 Tablet 3   • losartan (COZAAR) 50 MG Tab Take 1 Tablet by mouth every day. 90 Tablet 3   • apixaban (ELIQUIS) 5mg Tab Take 1 Tablet by mouth 2 times a day. Indications: Thromboembolism secondary to Atrial Fibrillation (Patient not taking: Reported on 4/9/2024) 180 Tablet 0   • albuterol 108 (90 Base) MCG/ACT Aero Soln inhalation aerosol Inhale 2 Puffs every 6 hours as needed for Shortness of Breath. (Patient not taking: Reported on 4/9/2024)       No facility-administered encounter medications on file as of 4/9/2024.     Review of Systems   Constitutional: Negative.  Negative for chills, fever and malaise/fatigue.   HENT: Negative.     Eyes: Negative.  Negative for blurred vision and double vision.   Respiratory:  Negative for cough and shortness of breath.    Cardiovascular:  Negative for chest pain, palpitations, orthopnea, claudication, leg swelling and PND.   Gastrointestinal: Negative.  Negative for abdominal pain, nausea and vomiting.   Genitourinary: Negative.    Musculoskeletal: Negative.  Negative for myalgias.   Skin: Negative.  Negative for rash.   Neurological: Negative.  Negative for dizziness, loss of consciousness, weakness and headaches.   Endo/Heme/Allergies: Negative.  Does not bruise/bleed easily.   Psychiatric/Behavioral: Negative.                Objective     /62 (BP Location: Left arm, Patient Position: Sitting, BP Cuff Size: Adult)   Pulse 86   Resp 16   Ht 1.778 m (5' 10\")   Wt 88.5 kg (195 lb)   SpO2 90%   BMI 27.98 kg/m²     Physical Exam           Assessment & Plan     No diagnosis found.    Medical Decision Making: Today's Assessment/Status/Plan:        Paroximal Atrial fibrillation with Hx RVR  -Asymptomatic  - S/P successful SABRINA guided DCCV 1/5/2022.  Remains in sinus rhythm in office today without evidence of recurrence.  -Continue Metoprolol XL 50 mg daily  - [id number] at least 5, continue " apixaban 5 mg twice daily  -Repeat CBC and CMP     Heart failure with reduced EF suspect secondary to tachycardia induced cardiomyopathy; Stage C, Class I, LVEF 55% (recovered from 20%):  Hypertension  -Ischemic cardiomyopathy ruled out  -ACE-I/ARB/ARNI: Continue losartan 50mg daily  -Evidence Based Beta-blocker:  Continue metoprolol XL 50  -Aldosterone Antagonist:  Continue spironolactone 25 mg daily  -Diuretic:  Continue torsemide 20 mg daily  -EF recovered to within normal limits- no indication for primary AICD at this time  -Labs:  Repeat CMP, CBC and lipid panel  -Reinforced s/sx of worsening heart failure with patient and weight monitoring. Pt verbalizes understanding. Pt to call office if present.       Nonobstructive coronary artery disease  -Asymptomatic  - Atorvastatin 80 mg daily  -Beta-blocker per above  -No ASA due to OAC     Retinal Occlusion, remote  - cont statin                 systolic function is now   normal with trace mitral regurgitation.  Normal left ventricular systolic function.   The left ventricular ejection fraction is estimated to be 55%.  No significant valvular abnormalities.      Echocardiogram 3/21/2022  CONCLUSIONS  Compared to the prior echo on 1/4/2022, there has been improvement in   left ventricular systolic function.  Mild concentric left ventricular hypertrophy. Moderately reduced left   ventricular systolic function. The left ventricular ejection fraction   is visually estimated to be 40%.   Mild mitral regurgitation.     SABRINA 1/5/2022  CONCLUSIONS  Severely reduced left ventricular systolic function.  Normal left atrial appendage. No thrombus detected in the left atrial   appendage.  Moderate mitral regurgitation. The calculated effective regurgitant   orifice is 0.31 sq cm.      Echocardiogram 1/4/2022  CONCLUSIONS  No prior study is available for comparison.   The left ventricular ejection fraction is visually estimated to be 20%.  Grade II diastolic dysfunction.  The right ventricle is dilated.  Reduced right ventricular systolic function.  Normal inferior vena cava size and inspiratory collapse.  Moderate mitral regurgitation.  Moderate tricuspid regurgitation.  Estimated right ventricular systolic pressure is 48  mmHg.     Cardiac event monitoring 5/18/2022  Summary:   Short runs of supraventricular tachycardia that do not meet criteria for diagnosis of atrial flutter or atrial fibrillation.   Rare premature atrial complexes (<1%) and premature ventricular complexes (<1%).   No malignant arrhythmias identified.   No sinus pause.   No significant AV block.   Electronically Signed by: Mac Mendez M.D.      [facility] 1/8/2022  CORONARY ANGIOGRAPHY:  The left main coronary artery: normal appearing, bifurcates to an LAD and L-Cx  The left anterior descending coronary artery: large vessel, wraps around the apex, gives off 2 large Diagonal branches, with a 50%-60%  stenosis right after D2 take-off, iFR 0.92.  The left circumflex coronary artery: large vessel, gives off 2 very small OM1 and OM2 with a large OM3 (has 20% ostial stenosis). True circumflex tapers into the AV groove.  The right coronary artery: large vessel, dominant, mild luminal irregularities     INSTANTANEOUS WAVE FREE RATIO:  IFR of the mid coronary artery was 0.92     IMPRESSION:  Non-obstructive CAD , iFR mid LAD 0.92 suggestive of a non-hemodynamically significant stenosis.  Non-ischemic etiology of cardiomyopathy  Slightly elevated LVEDP at 14mmHg (normal <= 12 mmHg)     RECOMMENDATIONS:  [place] intensity statin  Resume Apixaban 5mg PO every 12hours tonight as long no arteriotomy site complications exist  GDMT for cardiomyopathy and HF with close Cardiology follow up  Lifestyle modifications to reduced ASCVD risk  Cardiac rehab referral         Assessment & Plan     1. Paroxysmal atrial fibrillation (HCC)  apixaban (ELIQUIS) 5mg Tab    CBC WITH DIFFERENTIAL      2. Dyslipidemia  Lipid Profile      3. High risk medication use  Comp Metabolic Panel      4. Chronic anticoagulation  CBC WITH DIFFERENTIAL      5. ACC/AHA stage C systolic heart failure (HCC)  Comp Metabolic Panel      6. Non-ischemic cardiomyopathy (HCC)        7. Chronic systolic congestive heart failure, NYHA class 1 (HCC)        8. Primary hypertension        9. Stenosis of left anterior descending (LAD) artery        10. Retinal vessel occlusion        11. Coronary artery disease due to calcified coronary lesion            Medical Decision Making: Today's Assessment/Status/Plan:        Paroximal Atrial fibrillation with Hx RVR  -Asymptomatic  - S/P successful SABRINA guided DCCV 1/5/2022.    -Sinus rhythm on exam today.  -Continue Metoprolol XL 50 mg daily  - [id number] at least 5.he has not been taking Eliquis because he cannot afford it.  Placed refills on his medications and requested samples.  Discussed anticoagulation role in the  prevention of CVA.  Recommended continuing apixaban 5 mg twice daily.  -Repeat CBC and CMP     Heart failure with reduced EF suspect secondary to tachycardia induced cardiomyopathy; Stage C, Class I, LVEF 55% (recovered from 20%):  Hypertension  -Ischemic cardiomyopathy ruled out  -ACE-I/ARB/ARNI: Continue losartan 50mg daily  -Evidence Based Beta-blocker:  Continue metoprolol XL 50  -Aldosterone Antagonist:  Continue spironolactone 25 mg daily  -Diuretic:  Continue torsemide 20 mg daily  -EF recovered to within normal limits- no indication for primary AICD at this time  -Labs:  Repeat CMP, CBC and lipid panel  -Reinforced s/sx of worsening heart failure with patient and weight monitoring. Pt verbalizes understanding. Pt to call office if present.       Nonobstructive coronary artery disease  -Asymptomatic  -Continue rosuvastatin 20 mg daily.  -Repeat lipid panel.  -Beta-blocker per above  -No ASA due to OAC     Retinal Occlusion, remote  - cont statin    Follow-up in 1 year or sooner with clinical changes.    Encouraged him to reach out via MyChart or telephone with any questions or concerns.    Thank you for allowing me to participate in the care of Miah Oneill .    Suzanne Neri PA-C, Cardiology  [facility], [facility]  [address] [address]  Phone: [phone]  Fax: [phone]    PLEASE NOTE: This note was created using voice recognition software. I have made every reasonable attempt to correct obvious errors, but I expect that there are errors of grammar and possibly content that I did not discover before finalizing the note.

## 2024-04-12 DIAGNOSIS — I50.22 CHRONIC SYSTOLIC HEART FAILURE (HCC): ICD-10-CM

## 2024-04-12 DIAGNOSIS — I10 ESSENTIAL HYPERTENSION: ICD-10-CM

## 2024-04-12 RX ORDER — LOSARTAN POTASSIUM 50 MG/1
50 TABLET ORAL DAILY
Qty: 90 TABLET | Refills: 3 | Status: SHIPPED | OUTPATIENT
Start: 2024-04-12

## 2024-04-12 NOTE — TELEPHONE ENCOUNTER
Is the patient due for a refill? Yes    Was the patient seen the past year? Yes    Date of last office visit: 04/09/2024    Does the patient have an upcoming appointment?  No   If yes, When?     Provider to refill:AM    Does the patients insurance require a 100 day supply?  No

## 2024-04-16 DIAGNOSIS — E78.5 DYSLIPIDEMIA: ICD-10-CM

## 2024-04-16 LAB
CHOLEST SERPL-MCNC: 152 MG/DL
HDLC SERPL-MCNC: 62 MG/DL
LDLC SERPL CALC-MCNC: 72 MG/DL
TRIGL SERPL-MCNC: 88 MG/DL

## 2024-05-18 DIAGNOSIS — I50.21 ACUTE SYSTOLIC HEART FAILURE (HCC): ICD-10-CM

## 2024-05-20 RX ORDER — TORSEMIDE 20 MG/1
20 TABLET ORAL DAILY
Qty: 90 TABLET | Refills: 2 | Status: SHIPPED | OUTPATIENT
Start: 2024-05-20

## 2024-06-30 DIAGNOSIS — I50.21 ACUTE SYSTOLIC HEART FAILURE (HCC): ICD-10-CM

## 2024-07-01 RX ORDER — SPIRONOLACTONE 25 MG/1
TABLET ORAL
Qty: 90 TABLET | Refills: 3 | Status: SHIPPED | OUTPATIENT
Start: 2024-07-01

## 2024-07-01 RX ORDER — METOPROLOL SUCCINATE 50 MG/1
TABLET, EXTENDED RELEASE ORAL
Qty: 90 TABLET | Refills: 3 | Status: SHIPPED | OUTPATIENT
Start: 2024-07-01

## 2024-07-19 DIAGNOSIS — I50.21 ACUTE SYSTOLIC HEART FAILURE (HCC): ICD-10-CM

## 2024-07-19 RX ORDER — ROSUVASTATIN CALCIUM 10 MG/1
20 TABLET, COATED ORAL DAILY
Qty: 180 TABLET | Refills: 3 | Status: SHIPPED | OUTPATIENT
Start: 2024-07-19

## 2025-01-02 ENCOUNTER — TELEPHONE (OUTPATIENT)
Dept: CARDIOLOGY | Facility: MEDICAL CENTER | Age: 70
End: 2025-01-02
Payer: MEDICARE

## 2025-01-02 NOTE — TELEPHONE ENCOUNTER
AM    Patient is seeking to have financial assistance paperwork to be completed. He would like to  the paperwork when it is finished. Documents scanned into media and delivered to [employer].

## 2025-01-02 NOTE — TELEPHONE ENCOUNTER
Received patients Provider application form for Eliquis assistance through [employer] in clinic for 2025.    Filled out Provider form for application, and awaiting return of Provider for signature. Once signature is received, will fax to [facility] [place] renewal eligibility due to patient not having pharmacy insurance at this time.

## 2025-01-13 NOTE — TELEPHONE ENCOUNTER
Caller: Miah Oneill    Topic/issue: Patient calling to see if this form has been completed - if so he would like to pick it up. Please give him a callback to advise.     Callback Number: [phone]    Thank you,  Suzanne COLEMAN

## 2025-01-30 ENCOUNTER — OFFICE VISIT (OUTPATIENT)
Dept: CARDIOLOGY | Facility: MEDICAL CENTER | Age: 70
End: 2025-01-30
Attending: NURSE PRACTITIONER
Payer: MEDICARE

## 2025-01-30 ENCOUNTER — PHARMACY VISIT (OUTPATIENT)
Dept: PHARMACY | Facility: MEDICAL CENTER | Age: 70
End: 2025-01-30
Payer: COMMERCIAL

## 2025-01-30 VITALS
WEIGHT: 194 LBS | HEART RATE: 68 BPM | BODY MASS INDEX: 27.77 KG/M2 | OXYGEN SATURATION: 92 % | RESPIRATION RATE: 16 BRPM | DIASTOLIC BLOOD PRESSURE: 60 MMHG | HEIGHT: 70 IN | SYSTOLIC BLOOD PRESSURE: 106 MMHG

## 2025-01-30 DIAGNOSIS — I50.20 ACC/AHA STAGE C SYSTOLIC HEART FAILURE (HCC): ICD-10-CM

## 2025-01-30 DIAGNOSIS — Z79.899 HIGH RISK MEDICATION USE: ICD-10-CM

## 2025-01-30 DIAGNOSIS — I48.0 PAROXYSMAL ATRIAL FIBRILLATION (HCC): ICD-10-CM

## 2025-01-30 DIAGNOSIS — I50.9 HEART FAILURE, NYHA CLASS 1 (HCC): ICD-10-CM

## 2025-01-30 DIAGNOSIS — E78.5 DYSLIPIDEMIA: ICD-10-CM

## 2025-01-30 DIAGNOSIS — D68.69 SECONDARY HYPERCOAGULABLE STATE (HCC): ICD-10-CM

## 2025-01-30 DIAGNOSIS — I42.8 NON-ISCHEMIC CARDIOMYOPATHY (HCC): ICD-10-CM

## 2025-01-30 LAB — EKG IMPRESSION: NORMAL

## 2025-01-30 PROCEDURE — 93005 ELECTROCARDIOGRAM TRACING: CPT | Mod: TC | Performed by: NURSE PRACTITIONER

## 2025-01-30 PROCEDURE — RXMED WILLOW AMBULATORY MEDICATION CHARGE: Performed by: PHYSICIAN ASSISTANT

## 2025-01-30 PROCEDURE — 99212 OFFICE O/P EST SF 10 MIN: CPT | Performed by: NURSE PRACTITIONER

## 2025-01-30 ASSESSMENT — ENCOUNTER SYMPTOMS
MYALGIAS: 0
DIZZINESS: 0
FEVER: 0
ORTHOPNEA: 0
SHORTNESS OF BREATH: 0
CLAUDICATION: 0
PALPITATIONS: 0
PND: 0
COUGH: 0
ABDOMINAL PAIN: 0

## 2025-01-30 ASSESSMENT — MINNESOTA LIVING WITH HEART FAILURE QUESTIONNAIRE (MLHF)
MAKING YOU FEEL DEPRESSED: 0
DIFFICULTY SLEEPING WELL AT NIGHT: 0
SWELLING IN ANKLES OR LEGS: 0
COSTING YOU MONEY FOR MEDICAL CARE: 0
EATING LESS FOODS YOU LIKE: 3
FEELING LIKE A BURDEN TO FAMILY AND FRIENDS: 0
DIFFICULTY WITH SEXUAL ACTIVITIES: 0
HAVING TO SIT OR LIE DOWN DURING THE DAY: 0
TIRED, FATIGUED OR LOW ON ENERGY: 0
MAKING YOU WORRY: 0
DIFFICULTY SOCIALIZING WITH FAMILY OR FRIENDS: 0
TOTAL_SCORE: 3
DIFFICULTY WITH RECREATIONAL PASTIMES, SPORTS, HOBBIES: 0
DIFFICULTY TO CONCENTRATE OR REMEMBERING THINGS: 0
MAKING YOU STAY IN A HOSPITAL: 0
DIFFICULTY GOING AWAY FROM HOME: 0
WALKING ABOUT OR CLIMBING STAIRS DIFFICULT: 0
WORKING AROUND THE HOUSE OR YARD DIFFICULT: 0
MAKING YOU SHORT OF BREATH: 0
LOSS OF SELF CONTROL IN YOUR LIFE: 0
GIVING YOU SIDE EFFECTS FROM TREATMENTS: 0
DIFFICULTY WORKING TO EARN A LIVING: 0

## 2025-01-30 NOTE — PROGRESS NOTES
Chief Complaint   Patient presents with    Atrial Fibrillation     Fv Dx Atrial fibrillation with rapid ventricular response (HCC)    Hypertension     Fv Dx Primary hypertension    CHF (Systolic)     Fv Dx Acute systolic heart failure (HCC)       Subjective     MILLY Oneill is a 69 y.o. male who presents today for follow-up on his paroxysmal A-fib, tachycardia induced cardiomyopathy, dyslipidemia, CAD.    Previous patient of Dr. Porter.  Patient was last seen in clinic on 2024 with Suzanne Neri PA-C.  At that visit, patient was sent for lab testing.    Over the year, patient reports he has been doing okay. Patient denies chest pain, shortness of breath, palpitations, dizziness/lightheadedness, orthopnea, PND or Edema.      He does not have insurance coverage, and is applying for patient assistance for his Eliquis.    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    Additionally, patient has the following medical problems:     -Former smoker    -Remote retinal occlusion    Past Medical History:   Diagnosis Date    CAD (coronary artery disease)     COVID     Hyperlipidemia     Paroxysmal atrial fibrillation (HCC)     Retinal vascular occlusion      History reviewed. No pertinent surgical history.  History reviewed. No pertinent family history.  Social History     Socioeconomic History    Marital status:      Spouse name: Not on file    Number of children: Not on file    Years of education: Not on file    Highest education level: Not on file   Occupational History    Not on file   Tobacco Use    Smoking status: Former     Current packs/day: 0.00     Types: Cigarettes     Quit date:      Years since quittin.0    Smokeless tobacco: Never   Vaping Use    Vaping status: Never Used   Substance and Sexual Activity    Alcohol use: Yes     Comment: occ    Drug use: Not Currently    Sexual activity: Not on file   Other Topics Concern     Not on file   Social History Narrative    Not on file     Social Drivers of Health     Financial Resource Strain: Not on file   Food Insecurity: Not on file   Transportation Needs: Not on file   Physical Activity: Not on file   Stress: Not on file   Social Connections: Not on file   Intimate Partner Violence: Not on file   Housing Stability: Not on file     No Known Allergies  Outpatient Encounter Medications as of 1/30/2025   Medication Sig Dispense Refill    apixaban (ELIQUIS) 5mg Tab Take 1 tablet by mouth 2 times a day. Indications: Thromboembolism secondary to Atrial Fibrillation 60 Tablet 0    rosuvastatin (CRESTOR) 10 MG Tab take 2 tablets by mouth once daily 180 Tablet 3    metoprolol SR (TOPROL XL) 50 MG TABLET SR 24 HR take 1 tablet by mouth once daily 90 Tablet 3    spironolactone (ALDACTONE) 25 MG Tab take 1 tablet by mouth once daily 90 Tablet 3    torsemide (DEMADEX) 20 MG Tab take 1 tablet by mouth once daily 90 Tablet 2    losartan (COZAAR) 50 MG Tab take 1 tablet by mouth once daily 90 Tablet 3    [DISCONTINUED] apixaban (ELIQUIS) 5mg Tab Take 1 Tablet by mouth 2 times a day. 56 Tablet 0    albuterol 108 (90 Base) MCG/ACT Aero Soln inhalation aerosol Inhale 2 Puffs every 6 hours as needed for Shortness of Breath. (Patient not taking: Reported on 1/30/2025)       No facility-administered encounter medications on file as of 1/30/2025.     Review of Systems   Constitutional:  Negative for fever and malaise/fatigue.   Respiratory:  Negative for cough and shortness of breath.    Cardiovascular:  Negative for chest pain, palpitations, orthopnea, claudication, leg swelling and PND.   Gastrointestinal:  Negative for abdominal pain.   Musculoskeletal:  Negative for myalgias.   Neurological:  Negative for dizziness.   All other systems reviewed and are negative.             Objective     /60 (BP Location: Right arm, Patient Position: Sitting, BP Cuff Size: Adult)   Pulse 68   Resp 16   Ht 1.778 m  "(5' 10\")   Wt 88 kg (194 lb)   SpO2 92%   BMI 27.84 kg/m²     Physical Exam  Vitals reviewed.   Constitutional:       Appearance: He is well-developed.   HENT:      Head: Normocephalic and atraumatic.   Eyes:      Pupils: Pupils are equal, round, and reactive to light.   Neck:      Vascular: No JVD.   Cardiovascular:      Rate and Rhythm: Normal rate and regular rhythm.      Heart sounds: Normal heart sounds.   Pulmonary:      Effort: Pulmonary effort is normal. No respiratory distress.      Breath sounds: Normal breath sounds. No wheezing or rales.   Abdominal:      General: Bowel sounds are normal.      Palpations: Abdomen is soft.   Musculoskeletal:         General: Normal range of motion.      Cervical back: Normal range of motion and neck supple.      Right lower leg: No edema.      Left lower leg: No edema.   Skin:     General: Skin is warm and dry.   Neurological:      General: No focal deficit present.      Mental Status: He is alert and oriented to person, place, and time.   Psychiatric:         Behavior: Behavior normal.       Lab Results   Component Value Date/Time    CHOLSTRLTOT 152 04/16/2024 12:00 AM    LDL 72 04/16/2024 12:00 AM    HDL 62 04/16/2024 12:00 AM    TRIGLYCERIDE 88 04/16/2024 12:00 AM       Lab Results   Component Value Date/Time    SODIUM 139 01/08/2022 04:51 AM    POTASSIUM 4.3 01/08/2022 04:51 AM    CHLORIDE 100 01/08/2022 04:51 AM    CO2 27 01/08/2022 04:51 AM    GLUCOSE 103 (H) 01/08/2022 04:51 AM    BUN 42 (H) 01/08/2022 04:51 AM    CREATININE 1.68 (H) 01/08/2022 04:51 AM     Lab Results   Component Value Date/Time    ALKPHOSPHAT 117 (H) 01/03/2022 04:00 PM    ASTSGOT 41 01/03/2022 04:00 PM    ALTSGPT 49 01/03/2022 04:00 PM    TBILIRUBIN 1.2 01/03/2022 04:00 PM      Transthoracic Echo Report 1/4/2022  No prior study is available for comparison.   The left ventricular ejection fraction is visually estimated to be 20%.  Grade II diastolic dysfunction.  The right ventricle is " dilated.  Reduced right ventricular systolic function.  Normal inferior vena cava size and inspiratory collapse.  Moderate mitral regurgitation.  Moderate tricuspid regurgitation.  Estimated right ventricular systolic pressure is 48  mmHg.     Transesophageal Echo Report 1/5/2022  Severely reduced left ventricular systolic function.  Normal left atrial appendage. No thrombus detected in the left atrial   appendage.  Moderate mitral regurgitation. The calculated effective regurgitant   orifice is 0.31 sq cm.     CT-CTA of the heart 1/6/2022  1.  Approximately 70% stenosis in the proximal LAD secondary to mixed calcific and soft plaque. Degree of stenosis in the more distal LAD is difficult to quantitate secondary to small vessel size and dense calcific plaque.     2.  Minimal stenosis in the right coronary and left circumflex arteries.     3.  Bilateral pleural effusions with adjacent atelectasis. Mosaic attenuation in the lungs may represent pulmonary edema.     4.  Calcium Score of 100-399 AND <75th percentile:     You have significant cholesterol (plaque) build-up in the arteries that supply blood flow to your heart. This does not mean you have any blockages in your arteries that require an intervention at this time, but you are at higher risk of developing heart   disease or a stroke. Therefore, you need to be aggressive about preventing further plaque build-up. We recommend treating this plaque with a healthy low saturated fat, low sugar, mostly plant based diet and regular exercise. We highly recommend the use   of aspirin (low dose, 81 mg once a day) and a cholesterol medication to help prevent further plaque build-up; please discuss these recommendations with your doctor. If you smoke, this likely has contributed to your cholesterol build-up, and you should   quit as soon as possible. Please let your doctor know if you need medications or other resources to help you quit. If you are overweight, we also  recommend gradual weight loss until you reach a normal weight. Each of these recommendations will lower your   future risk of heart disease and stroke, and can even help decrease the amount of plaque you currently have. We do not routinely recommend any further testing based on this test result.    Cardiac cath 1/8/2022  HEMODYNAMICS:   Aortic pressure: 87/60 mmHg  LVEDP: 14 mmHg  No significant aortic gradient on pullback     CORONARY ANGIOGRAPHY:  The left main coronary artery: normal appearing, bifurcates to an LAD and L-Cx  The left anterior descending coronary artery: large vessel, wraps around the apex, gives off 2 large Diagonal branches, with a 50%-60% stenosis right after D2 take-off, iFR 0.92.  The left circumflex coronary artery: large vessel, gives off 2 very small OM1 and OM2 with a large OM3 (has 20% ostial stenosis). True circumflex tapers into the AV groove.  The right coronary artery: large vessel, dominant, mild luminal irregularities     INSTANTANEOUS WAVE FREE RATIO:  IFR of the mid coronary artery was 0.92     IMPRESSION:  Non-obstructive CAD , iFR mid LAD 0.92 suggestive of a non-hemodynamically significant stenosis.  Non-ischemic etiology of cardiomyopathy  Slightly elevated LVEDP at 14mmHg (normal <= 12 mmHg)     RECOMMENDATIONS:  [place] intensity statin  Resume Apixaban 5mg PO every 12hours tonight as long no arteriotomy site complications exist  GDMT for cardiomyopathy and HF with close Cardiology follow up  Lifestyle modifications to reduced ASCVD risk  Cardiac rehab referral     NOTIFICATION:  The patient's son (Marv) was notified of the results of over the phone.     Referring provider was notified of the findings listed above.    Transthoracic Echo Report 3/21/2022  Compared to the prior echo on 1/4/2022, there has been improvement in   left ventricular systolic function.  Mild concentric left ventricular hypertrophy. Moderately reduced left   ventricular systolic function. The left  ventricular ejection fraction   is visually estimated to be 40%.   Mild mitral regurgitation.    Zio patch 4/29/2022 through 5/13/2022  Summary:   Short runs of supraventricular tachycardia that do not meet criteria for diagnosis of atrial flutter or atrial fibrillation.   Rare premature atrial complexes (<1%) and premature ventricular complexes (<1%).   No malignant arrhythmias identified.   No sinus pause.   No significant AV block.      Transthoracic Echo Report 12/6/2022  Compared to the prior study on 03/21/2022, systolic function is now   normal with trace mitral regurgitation.  Normal left ventricular systolic function.   The left ventricular ejection fraction is estimated to be 55%.  No significant valvular abnormalities.          Assessment & Plan     1. Secondary hypercoagulable state (HCC)        2. Paroxysmal atrial fibrillation (HCC)  EKG      3. High risk medication use  Comp Metabolic Panel    CBC WITH DIFFERENTIAL    Lipid Profile      4. Dyslipidemia  Comp Metabolic Panel    CBC WITH DIFFERENTIAL    Lipid Profile      5. Non-ischemic cardiomyopathy (HCC)        6. ACC/AHA stage C systolic heart failure (HCC)        7. Heart failure, NYHA class 1 (HCC)            Medical Decision Making: Today's Assessment/Status/Plan:        Paroxysmal A-fib, s/p DCCV:  -EKG today shows sinus bradycardia 58  -Continue Eliquis 5 mg twice a day, patient has a sample prescription for 1 month and is trying to get patient assistance for his Eliquis  -Encouraged patient to sign up for drug coverage at the end of this year as he should be on this drug indefinitely  -Continue metoprolol SR 50 mg daily    HFrEF, Stage C, Class 1, LVEF improved to 55% from 20%: 2Based on physical examination findings, patient is euvolemic. No JVD, lungs are clear to auscultation, no pitting edema in bilateral lower extremities, no ascites.  -Heart failure due to tachycardia induced cardiomyopathy  -ACE-I/ARB/ARNI: Continue losartan 50 mg  daily  -Evidence Based Beta-blocker: Continue metoprolol SR 50 mg daily  -Aldosterone Antagonist: Continue spironolactone 25 mg daily  -SGLT2 inhibitor: Consider, patient is doing okay at this time  -Diuretic: Continue torsemide 20 mg daily  -Labs: CBC, CMP and lipid panel due at this time  -Device: Not indicated  -Reinforced s/sx of worsening heart failure with patient and weight monitoring. Pt verbalizes understanding. Pt to call office or RTC if present.    -PUMP line number [phone] (PUMP)  -Heart Failure Education: Heart failure education reviewed  -Advanced care planning: Advanced directive and POLST to be discussed at a future visit    Nonobstructive CAD/DLD:  -Last LDL 72 on 4/16/2024  -Continue rosuvastatin 20 mg daily  -Not on aspirin as he is on OAC  -CBC, CMP and lipid panel due at this time    FU in clinic in 1 year with Suzanne or myself. Sooner if needed.    Patient verbalizes understanding and agrees with the plan of care.     PLEASE NOTE: This Note was created using voice recognition Software. I have made every reasonable attempt to correct obvious errors, but I expect that there are errors of grammar and possibly content that I did not discover before finalizing the note

## 2025-02-20 LAB
CHOLEST SERPL-MCNC: 170 MG/DL
HDLC SERPL-MCNC: 74 MG/DL
LDLC SERPL CALC-MCNC: 80 MG/DL
TRIGL SERPL-MCNC: 80 MG/DL

## 2025-02-21 ENCOUNTER — TELEPHONE (OUTPATIENT)
Dept: CARDIOLOGY | Facility: MEDICAL CENTER | Age: 70
End: 2025-02-21
Payer: MEDICARE

## 2025-02-21 NOTE — TELEPHONE ENCOUNTER
Medication: Eliquis  Type of Insurance: Government funded (Medicare/Medicare Advantage)  Type of Financial assistance requested MAP/Free Drug  Source: [employer]  Source Phone #: [phone]  Outcome: Approved  Effective dates: 02/21/25 until 12/31/25    Final Copay: $0

## 2025-02-24 DIAGNOSIS — E78.5 DYSLIPIDEMIA: ICD-10-CM

## 2025-02-24 DIAGNOSIS — Z79.899 HIGH RISK MEDICATION USE: ICD-10-CM

## 2025-02-25 ENCOUNTER — RESULTS FOLLOW-UP (OUTPATIENT)
Dept: CARDIOLOGY | Facility: MEDICAL CENTER | Age: 70
End: 2025-02-25

## 2025-02-26 ENCOUNTER — RESULTS FOLLOW-UP (OUTPATIENT)
Dept: CARDIOLOGY | Facility: MEDICAL CENTER | Age: 70
End: 2025-02-26

## 2025-04-04 DIAGNOSIS — I10 ESSENTIAL HYPERTENSION: ICD-10-CM

## 2025-04-04 DIAGNOSIS — I50.22 CHRONIC SYSTOLIC HEART FAILURE (HCC): ICD-10-CM

## 2025-04-04 RX ORDER — LOSARTAN POTASSIUM 50 MG/1
50 TABLET ORAL DAILY
Qty: 90 TABLET | Refills: 2 | Status: SHIPPED | OUTPATIENT
Start: 2025-04-04

## 2025-04-07 DIAGNOSIS — I50.21 ACUTE SYSTOLIC HEART FAILURE (HCC): ICD-10-CM

## 2025-04-07 NOTE — TELEPHONE ENCOUNTER
Received request via: Patient    Was the patient seen in the last year in this department? Yes    Does the patient have an active prescription (recently filled or refills available) for medication(s) requested? Yes. Refill request has been refused in Epic. Contacted pharmacy and called in most recent prescription.    Pharmacy Name: [facility] in [facility]     Does the patient have senior living Plus and need 100-day supply? (This applies to ALL medications) Patient does not have SCP

## 2025-04-08 RX ORDER — TORSEMIDE 20 MG/1
20 TABLET ORAL DAILY
Qty: 90 TABLET | Refills: 2 | Status: SHIPPED | OUTPATIENT
Start: 2025-04-08

## 2025-06-21 DIAGNOSIS — I50.21 ACUTE SYSTOLIC HEART FAILURE (HCC): ICD-10-CM

## 2025-06-23 RX ORDER — METOPROLOL SUCCINATE 50 MG/1
50 TABLET, EXTENDED RELEASE ORAL DAILY
Qty: 90 TABLET | Refills: 1 | Status: SHIPPED | OUTPATIENT
Start: 2025-06-23

## 2025-06-23 RX ORDER — SPIRONOLACTONE 25 MG/1
25 TABLET ORAL DAILY
Qty: 90 TABLET | Refills: 0 | Status: SHIPPED | OUTPATIENT
Start: 2025-06-23

## 2025-06-23 NOTE — TELEPHONE ENCOUNTER
Chart reviewed last OV 01/30/2025 no changes to medications. Printed CMP order will mail to patient to complete per protocol.

## 2025-07-10 DIAGNOSIS — I50.21 ACUTE SYSTOLIC HEART FAILURE (HCC): ICD-10-CM

## 2025-07-10 DIAGNOSIS — E78.5 DYSLIPIDEMIA: ICD-10-CM

## 2025-07-10 DIAGNOSIS — I48.0 PAROXYSMAL ATRIAL FIBRILLATION (HCC): Primary | ICD-10-CM

## 2025-07-10 RX ORDER — ROSUVASTATIN CALCIUM 10 MG/1
20 TABLET, COATED ORAL DAILY
Qty: 180 TABLET | Refills: 3 | Status: SHIPPED | OUTPATIENT
Start: 2025-07-10

## 2025-07-10 NOTE — TELEPHONE ENCOUNTER
Rx refill for Rosuvastatin updated to 90 days per protocol and sent to preferred pharmacy. Last OV 1/30/25.  Next OV 12/4/25. Reminders sent via [employer] and by mail.     To JOSELITO.  Latest Lipid profile 1/30/25.  Requested EKG per last visit. Kindly review and approve if appropriate. Thank you.

## 2025-07-10 NOTE — TELEPHONE ENCOUNTER
Patient just verified still having enough supply for his Rosuvastatin.  Latest EKG results 1/30/25 in chart.

## 2025-07-10 NOTE — TELEPHONE ENCOUNTER
Is the patient due for a refill? Yes    Was the patient seen the last 15 months? Yes    Date of last office visit: 01.30.2025    Does the patient have an upcoming appointment?  Yes   If yes, When? 12.04.2025    Provider to refill:JOSELITO    Does the patient have USP Plus and need 100-day supply? (This applies to ALL medications) Patient does not have SCP

## 2025-07-30 DIAGNOSIS — E78.5 DYSLIPIDEMIA: ICD-10-CM

## 2025-07-30 DIAGNOSIS — Z79.899 HIGH RISK MEDICATION USE: ICD-10-CM
